# Patient Record
Sex: MALE | Race: WHITE | NOT HISPANIC OR LATINO | Employment: FULL TIME | URBAN - METROPOLITAN AREA
[De-identification: names, ages, dates, MRNs, and addresses within clinical notes are randomized per-mention and may not be internally consistent; named-entity substitution may affect disease eponyms.]

---

## 2022-12-05 ENCOUNTER — TELEPHONE (OUTPATIENT)
Dept: BEHAVIORAL HEALTH | Facility: CLINIC | Age: 42
End: 2022-12-05

## 2022-12-05 NOTE — TELEPHONE ENCOUNTER
Pt is a(n) adult (16+ out of HS) Seeking as eval for Adult DIANE Assessment for evaluation and recommendations. and is interested in Adult DIANE/Co-Occurring Program (Either In-Person or Virtual).  Appointment scheduled by:  Other  (do not run cost estimate if pt not calling for the appt themselves - send for bens)  Caller name:  [facility] [employer]    Caller phone #: [phone]  If in person, please state reason why: n/a  Brief reason for appt: Gamgling eval for programming    Cost estimate Did not get completed.  Contact information verified/updated: Yes

## 2022-12-09 ENCOUNTER — HOSPITAL ENCOUNTER (OUTPATIENT)
Dept: BEHAVIORAL HEALTH | Facility: CLINIC | Age: 42
Discharge: HOME | End: 2022-12-09
Attending: FAMILY MEDICINE | Admitting: FAMILY MEDICINE
Payer: COMMERCIAL

## 2022-12-09 ENCOUNTER — TELEPHONE (OUTPATIENT)
Dept: BEHAVIORAL HEALTH | Facility: CLINIC | Age: 42
End: 2022-12-09

## 2022-12-09 PROCEDURE — 90791 PSYCH DIAGNOSTIC EVALUATION: CPT | Mod: 95

## 2022-12-09 NOTE — PROGRESS NOTES
"Jose Antonio Krause is a 42 year old male who is participating in an evaluation for problem gambling via a billable phone/video session.    Telemedicine Visit: The patient's condition can be safely assessed and treated via synchronous audio and visual telemedicine encounter.      Reason for Telemedicine Visit: Services only offered telehealth    Originating Site (Patient Location): Patient's home    Distant Site (Provider Location): Provider Remote Setting- Home Office     The patient has been notified of the following:     \"We have found that certain health care needs can be provided without the need for a face to face visit.  This service lets us provide the care you need with a phone conversation.      I will have full access to your [facility] medical record during this entire phone call.   I will be taking notes for your medical record.     Since this is like an office visit, we will bill your insurance company for this service.  If your insurance denies the charge we will appeal and/or write off the cost of the service.  The Governor's executive order may result in expanded health insurance coverage for this service, which would be paid by your insurance.      There are potential benefits and risks of telephone visits (e.g. limits to patient confidentiality) that differ from in-person visits.?  Confidentiality still applies for telephone services, and nobody will record the visit.  It is important to be in a quiet, private space that is free of distractions (including cell phone or other devices) during the visit.??     If during the course of the call I believe a telephone visit is not appropriate, you will not be charged for this service\"    Consent:  The patient/guardian has verbally consented to: the potential risks and benefits of telemedicine (video visit) versus in person care; bill my insurance or make self-payment for services provided; and responsibility for payment of non-covered services. "     Consent has been obtained for this service by care team member: Yes    Mode of Communication:  Video Conference via [employer]    As the provider I attest to compliance with applicable laws and regulations related to telemedicine.    Phone visit start time:  10:30 AM  Phone visit end time:  12:30 PM    Length of session: 120 minutes    Counselor verified Patient with 2-point verification: Yes ;  Last name and .     Informed of Duty to Warn?   Yes       Verbal consent for Release of Information:   Yes - DHS    Releases sent to patient through [employer] for signature?  Yes - sent 2022    DHS - Problem Gambling [place]  SELF  Anna Agrawal - [facility]  Poncho Cuadra - Emergency Contact  [facility]     Gambling Evaluation   Background Information     Date of Assessment:  2022   :  MAMADOU [facility]   MRN:             [id number]   Patient Name:   Jose Antonio Krause   YOB: 1980 Age:  42 year old Gender:  male   Preferred Name:  Jose Antonio   Pronouns:  He/him   Current Address:   [address] [place] [address]     Preferred phone #:  [phone]   May we leave a program related message?  Yes     Relationship Status   Ethnicity  White   Client's Primary Language:  English   E-mail address  [email or web address]   Referral Source:  [facility] unit     Emergency :  Poncho Cuadra   Emergency Contact Phone #:  [phone]     Do you have learning disabilities or require special accommodations?    No     What prompted you to come for a gambling assessment today?     PT reported psychiatric hospitalization on 2022, reporting he was discharged on 2022.  PT reported suicidal ideations with a plan and went to the Emergency Department at [facility] following a significant financial loss gambling.  PT reported working with staff at [facility] finding a program for gambling evaluation/treatments reporting they  "found [facility] as an option.  PT denied gambling since 12/1/2022 - 12/2/2022.       Have you been diagnosed with a gambling problem?    PT reported entering into an outpatient problem gambling program about 15 years ago in [place]. PT reported attending outpatient group once per week and receiving psychiatry services.      Have you been diagnosed with alcohol or drug related problems?    No           DIMENSION I - Acute Intoxication /Withdrawal Potential     Gambling History    Early up to age 20:  PT reported gambling with friends under the age of 18, reporting \"wagers\".  PT reported age 16 started playing lottery tickets.  $20-$75 per time 2 -3 per week.  PT reported going to [employer] after about 6 months of playing lottery tickets.  PT reported continued lottery gambling, reporting he began playing [place] at the casino, about once per week. PT stated \"Progressed right away [gambling]\".  PT reported going to college in [place], reporting his gambling decreased, reporting he continued to play lottery about 2x per month and betting with friends on video games, and sporting games, such as tennis.     Age 20: PT reported he began playing Pull tabs, reporting playing with a \"Few hundred dollars\" per episode,  PT repoted occasional lottery tickets and occasional casino with his then girlfriend.  PT identified consequences as spending monies alocated for bill, financial and relational stressors.    Age 24: :  PT reported playing scratch offs, reporting a big win of $50,000.  PT reported he cards at [employer] \"all night\", and going to casino \"all of the time\".  PT reported palying [employer] online and casinos online. PT reported a lost of $17.000.    2010:  PT reported about 9 months to a year of abstinence.      Age 32: PT reported he began going to the casino with his wife, playing Bingo at the casino then began slots.  PT identified consequences of not telling is wife how much money he had and was " gambling with. PT reported his gambling continued to progress. PT reported he started going to the casino on own in addition to attending with others.     PT reported intermittent periods of 3-4 months of decreased or no gambling throughout.    Last Bet:  PT reported gamling Thursday at [employer], December 2, 2022.  PT reported gambling slots:  PT reported gambling $8000.    Substance Use History             X = Primary Drug Used   Age of First Use Most Recent Pattern of Use and Duration   Need enough information to show pattern (both frequency and amounts) and to show tolerance for each chemical that has a diagnosis   Date of last use and time, if needed   Withdrawal Potential? Requiring special care Method of use  (oral, smoked, snort, IV, etc)      Alcohol         HU/CU:  PT reports occasional use and denies any past or ongoing issues.  PAULA:  1 beer 2 weeks ago No       Marijuana/  Hashish   NA           Cocaine/Crack     N/A           Meth/  Amphetamines   N/A           Heroin     N/A           Other Opiates/  Synthetics     PT reported abusing Vicodin in the past when prescribed.  PT denied any recent use or abuse.         Inhalants     N/A           Benzodiazepines     N/A           Hallucinogens     N/A           Barbiturates/  Sedatives/  Hypnotics N/A           Over-the-Counter Drugs   N/A           Other     N/A           Nicotine       CU: Chewing tobacoo   Oral     Any current physical discomfort or withdrawal concerns?  No    Have you ever been to detox? No    How many times? NA    Have you had any of the following chemical dependency withdrawal symptoms?  Past 12 months Recent (past 30 days)   None None     Have you had any of the following gambling withdrawal symptoms?  Past 12 months Recent (past 30 days)   None None     Dimension I Ratings   Acute intoxication/Withdrawal potential - The placing authority must use the criteria in Dimension I to determine a client s acute intoxication and withdrawal  potential.    RISK DESCRIPTIONS - Severity ratin Client displays full functioning with good ability to tolerate and cope with withdrawal discomfort. No signs or symptoms of intoxication or withdrawal or resolving signs or symptoms.    REASONS SEVERITY WAS ASSIGNED (What about the amount of the person s use and date of most recent use and history of withdrawal problems suggests the potential of withdrawal symptoms requiring professional assistance? )     Patient does not appear to be under the influence or having withdrawal symptoms at the time of evaluation.  Evaluation was conducted via video conference.  No issues identified.           DIMENSION II - Biomedical Complications and Conditions     Do you have any current health/medical conditions?(Include any infectious diseases, allergies, or chronic or acute pain, history of chronic conditions)       Yes.   PT reports having an autoimmune disorder.  PT denies any current ongoing issues related to physical health.    List current medication(s) including over-the-counter or herbal supplements--including pain management:     Lexapro    Do you follow current medical recommendations/take medications as prescribed?     Yes    Do you have any dental problems?    No    Are you up to date on your medical, dental and eye appointments?     PT referred to primary providers for updated eye and dental exams.    Are you or have you ever been prescribed: Abilify (Aripiprazole), Requip (ropinirole) Zelapar (selegiline hydrochloride), Comtan (entacapone) Mirapex (pramipexole)?     No    Do you have a health care provider?    The patient's PCP is Nelson Escobar.    Do you need a referral to have a follow up with a primary care physician?    No.    Are you pregnant?     Male    Do you have any concerns regarding your nutritional status?    No    Have you had any appetite changes in the last 3 months?    No    Have you had weight loss or weight gain of more than 10 lbs in the last 3  "months?   If patient gained or lost more than 10 lbs, then refer to program RN / attending Physician for assessment.    PT reports going to the gym regulary and heating healthy.      Current height and weight?    5'9\" 178#    Do you have any pain control problems?     No    How is your pain managed?     NA    Do you have any concerns/problems with short or long-term memory?     Per PT \"I feel myself forgetting names sometimes\".  PT referred to Primary care provider.    Dimension II Ratings   Biomedical Conditions and Complications - The placing authority must use the criteria in Dimension II to determine a client s biomedical conditions and complications.   RISK DESCRIPTIONS - Severity ratin Client displays full functioning with good ability to cope with physical discomfort.    REASONS SEVERITY WAS ASSIGNED (What physical/medical problems does this person have that would inhibit his or her ability to participate in treatment? What issues does he or she have that require assistance to address?)    Patient denies any biomedical issues or concerns at this time.  Patient reports he is prescribed psychotropic medications, reporting medication compliance.  Patient reported having an established primary care provider in the community, reporting their ability to navigate the health care system independently.             DIMENSION III - Emotional, Behavioral, Cognitive Conditions and Complications     The patient grew up in:     [facility].  PT reported his parents  when he was age 4, and remained living with mom and older brother (2 years older).  PT reported his mother remarried and he has a maternal half brother, 15 years younger.  PT reported having a \"wonderful\" relationship with his mother, step father and brothers and a \"not so awesome\" relationship with his father at this time.     My childhood could be best described as:     Per PT \"Busy and active\".     Who raised you? (parents, grandparents, adoptive " "parents, step-parents, etc.)    Both Parents.  PT reported living with his mom following the divorce when he was age 4.     Growing up, the patient was supported by:     Per PT \"mom\"     Siblings:     1 older brother (2 years)  1 younger maternal half brother (15 years)     Family Substance Use Disorder/Gambling/Mental Health history:     PT reported:  DIANE:  Maternal uncles and grandmother drugs and alcohol.  MH: none known  Gambling: PT believes his father had gambling problem in his 20's.       Have you ever been emotionally or verbally abused?            No    Have you ever emotionally or verbally abused someone else?        No    Have you ever been physically abused?            No    Have you ever physically abused someone else?            No    Have you ever intentionally hurt yourself by hitting, cutting or burning yourself?            No    Do you have any thoughts of harming anyone?            No    Have you ever been sexually abused?            No    Have you ever sexually abused someone else?            No    Have you ever engaged in risky behavior that put you at risk for exposure to blood-borne or sexually transmitted diseases?    No    Have you ever been diagnosed with an eating disorder?          No    Have you ever been diagnosed with a clinical mental health disorder?            PT reports in 2010 he was diagnosed with ADHD and OCD when he attended gambling treatment.   PT reports a diagnosis of Depression from most recent psychiatric hospitalization at [facility].     Have you ever been prescribed any medications for your mental health?            Yes.  When were you prescribed these medications? PT reports he was prescribed medications in about 2010 and denied consistent medication compliance. PT reports he was prescribed Lexapro while at [facility] for psychiatric hospitalization December 2022, reporting current medication compliance.     What medications are you currently taking?            Medications: " "Lexapro.  Are you taking these medications as prescribed?  Yes.  How helpful are the medications?  PT reported he started taking them within the past week.    Are you currently seeing a mental health therapist?            Yes, please explain: PT reported having a mental health therapist, reporting he had a session on Wednesday, December 7, 2022 following discharge form Psychistric Hospitalization.  PT reported having additional sessions scheduled.  PT reported this as an established mental health therapist, reporting visits for a year following his divorce and suicide attempt a year and a half ago, reporting he stopped seeing her about 6 months ago prior to Wednesdays appointment.      Have you ever had a suicide attempt?    Yes, please explain: PT reported taking pills about a year and a half ago, reporting when he woke up, he went to the Emergency Department.    Have you ever had any psychiatric hospitalizations?            Yes, please explain: A year and a half ago due to suicide attempt.  PT also reports psychiatric hospitalization on December 2, 2022 - December 7, 2022 due to suicidal ideations with a plan following a gambling loss.    Have you ever been in the ?    No    Highest grade of school completed:     PT reported attending college, reporting he has 2 classes left for Bachelors.    Describe your preferred learning style:      by reading, by hands-on practice and by watching someone else demonstrate    Are you currently in school?            No    What are your greatest personal strengths?            Per patient \"caring for my family\".    What do you value most in life?            Per patient \"family and kids\".    GAIN Short Screener     1.)  When was the last time that you had significant problems...  A. with feeling very trapped, lonely, sad, blue, depressed or hopeless  about the future? Past Month    B. with sleep trouble, such as bad dreams, sleeping restlessly, or falling  asleep during the " day? PT reports he has never slept well.  PT reported family members as the same.    C. with feeling very anxious, nervous, tense, scared, panicked, or like  something bad was going to happen? Past Month    D. with becoming very distressed and upset when something reminded  you of the past? Past Month    E. with thinking about ending your life or committing suicide? Past Month    2.)  When was the last time that you did the following things two or more times?  A. Lied or conned to get things you wanted or to avoid having to do  something? Past month    B. Had a hard time paying attention at school, work, or home? Never    C. Had a hard time listening to instructions at school, work, or home? Never    D. Were a bully or threatened other people? Never    E. Started physical fights with other people? Never    Note: These questions are from the Global Appraisal of Individual Needs--Short Screener. Any item marked  past month  or  2 to 12 months ago  will be scored with a severity rating of at least 2.     For each item that has occurred in the past month or past year ask follow up questions to determine how often the person has felt this way or has the behavior occurred? How recently? How has it affected their daily living? And, whether they were using or in withdrawal at the time?    If the person has answered item 1E with  in the past year  or  the past month , ask about frequency and history of suicide in the family or someone close and whether they were under the influence.     PT reported admission to Emergency Department and admitted for psychiatric hospitalization at [facility] following suicidal ideations and having a plan on 12/2/2022. PT reported he discharged on 12/7/2022.  PT denied having any suicide ideation and/or self harm thoughts or plans following discharge from [facility] on 12/7/2022.    Has anyone close to you, a family member, a friend or a significant other attempted or completed a  suicide?     No    If the person answered item 1E  in the past month  ask about intent, plan, means and access and any other follow-up information to determine imminent risk. Document any actions taken to intervene on any identified imminent risk.       PT reported admission to Emergency Department and admitted for psychiatric hospitalization at [facility] following suicidal ideations and having a plan on 2022. PT reported he discharged on 2022.  PT denied having any suicide ideation and/or self harm thoughts or plans following discharge from [facility] on 2022.  PT reported he no longer has access to a gun or weapons at this time.  PT reported he was seen by psychiatry while at [facility] and was prescribed Lexapro.  PT also reported he has begun seeing his mental health therapist, reporting his last visit as 2022 following discharge from [facility] and reports having an additional session scheduled.    Dimension III Ratings   Emotional/Behavioral/Cognitive - The placing authority must use the criteria in Dimension III to determine a client s emotional, behavioral, and cognitive conditions and complications.   RISK DESCRIPTIONS - Severity ratin Client has difficulty with impulse control and lacks coping skills. Client has thoughts of suicide or harm to others without means; however, the thoughts may interfere with participation in some treatment activities. Client has difficulty functioning in significant life areas. Client has moderate symptoms of emotional, behavioral, or cognitive problems. Client is able to participate in most treatment activities.    REASONS SEVERITY WAS ASSIGNED - What current issues might with thinking, feelings or behavior pose barriers to participation in a treatment program? What coping skills or other assets does the person have to offset those issues? Are these problems that can be initially accommodated by a treatment provider? If not, what  "specialized skills or attributes must a provider have?    Patient reports a formal mental health diagnosis of Depression, reporting a current prescription for psychotropic medications.  Patient reports current medication compliance.  Patient's PHQ-9 score was 16 out of 27, indicating moderately severe depression.  Patient's CAROLANN-7 score was 13, indicating moderate anxiety.  Patient reported a suicide attempt in the past.   Patient reported having a mental health therapist at this time, reporting his last visit as 2022. Patient appears to lack impulse control and the necessary strategies to manage both mental health and emotional health at this time.  Patient would benefit from following all of the recommendations of current mental health providers.           DIMENSION IV - Readiness for Change     How has your gambling affected relationships in your life?     Per patient \"Always lying, deceitful to get money to go gambling.  Arguments in my marriage\". PT reported Lying and hiding his gambling.     How has your gambling affected your finances?     Per patient \"it's always put me in the position to scrape to pay bills\".      What values has gambling affected in your life?     Per patient \"value of being social and friendships\".    Has anyone expressed concern about your gambling?     No    What changes are you willing to make relative to your gambling?     Per patient \"I am for the first time ever. I'm ready to not walker and do what ever I need to do to stay out of it\".    How would you describe your current motivation to stop gambling?     Per patient \"very motivated.\".      Dimension IV Ratings   Readiness for Change - The placing authority must use the criteria in Dimension IV to determine a client s readiness for change.   RISK DESCRIPTIONS - Severity ratin Client is cooperative, motivated, ready to change, admits problems, committed to change, and engaged in treatment as a responsible " "participant.    REASONS SEVERITY WAS ASSIGNED - (What information did the person provide that supports your assessment of his or her readiness to change? How aware is the person of problems caused by continued use? How willing is she or he to make changes? What does the person feel would be helpful? What has the person been able to do without help?)      Patient reports their willingness to follow treatment recommendations, reporting internal motivation at this time.  Patient denies external motivation factors at this time, reporting their own awareness and decision to seek treatment services.  Patient displays insight into how gambling has impacted their life and for those around them, in addition, patient has expressed a desire to abstain from gambling and verbalizes their willingness to enter treatment and make significant life changes.           DIMENSION V - Relapse, Continued Use, and Continued Problem Potential     What triggers or situations increase your likelihood to walker?    Per patient \"Access to cash, feeling lonely and depressed, and relationship stressors \".    What was your longest period of abstinence from gambling?    PT reported about 9 months to a year following gambling treatment in 2010.    What did you do to stop gambling?    PT reports attending treatment.    What was your longest period of abstinence from alcohol/drugs?    PT denies any issues related to substance use at this time.    History of Gambling/Substance Use Disorder treatments  Where  (Program) When  (Year) Treatment   (CD/Gamb) Completed  (Yes/No) Length of time GA or CD [facility]     2010   Gambling      9 months to a year     If you had prior  or Gambling or Substance Use Disorder treatment, What was helpful?  What was not helpful?    PT reported feeling not fully engaged in gambling treatment in 2010.    Please identify which self-help groups you have attended and how often you attended (Gamblers Anonymous, AA, NA, " etc.)?    PT shared attending Gamblers Anonymous in the past and denied any recent attendance.    What has helped you reduce your urges to walker?    PT reported staying busy.      Dimension V Ratings   Relapse/Continued Use/Continued problem potential - The placing authority must use the criteria in Dimension V to determine a client s relapse, continued use, and continued problem potential.   RISK DESCRIPTIONS - Severity rating: 3 Client has poor recognition and understanding of relapse and recidivism issues and displays moderately high vulnerability for further substance use or mental health problems. Client has few coping skills and rarely applies coping skills.    REASONS SEVERITY WAS ASSIGNED - (What information did the person provide that indicates his or her understanding of relapse issues? What about the person s experience indicates how prone he or she is to relapse? What coping skills does the person have that decrease relapse potential?)      Patient displays limited understanding of addiction and relapse potential.  Patient reported attending Gamblers Anonymous (GA) groups in the past (about 2010) and denied any current attendance.  Patient reported attending an outpatient problem gambling treatment in the past.  Patient appears to lack knowledge of the addictions cycle, insight into their personal relapse process along with warning signs and triggers.    Patient appears to lack impulse control, gambling free coping skills and long-term maintenance skills.  The patient appears to be at risk for relapsing in gambling behaviors if they do not seek treatment services at this time.            DIMENSION VI - Recovery Environment   Are you currently working or in school? Please explain.     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     How has gambling affected your work?    PT reporrted calling into work following gambing all night and taking money from business account.    How would you  describe your current financial status?  In serious debt    Are you are having problems with unpaid bills, bankruptcy, IRS problems, etc.?    No    What is your approximate present gambling debt?    $50,0000 - $60,000    Describe a typical week for you i.e. work, leisure activities, socializing, etc.     PT reports working framing construction, 40-50 hours per week, Monday - Friday.  PT reported having occasional weekend side jobs.  Social/leisure, PT reported going to the gym daily for an hour.  PT reported visiting his brother often and his ex-brother in law.    Are you currently in a significant relationship?     No    Sexual Orientation:     Heterosexual    Who do you live with?      PT reports living alone.    Do you have any children?      Yes, please explain: Ages 22 and 18    How many times have you been ?    Once.  PT reports he is currently .    Describe your current support system i.e. family, friends, sponsor, therapist, etc.?      Moms, brothers, friends, , therapist, and children. PT also reported other business partners as supportive.    Do you have any past or present legal charges?    No    Do you have any obstacles that would prevent you from participating in treatment?    No    Do you pray, meditate, do yoga, attend AA/NA/GA or other spiritual practices?    Yes, please explain: meditation.    Do you participate in community brian activies such as attending Shinto, temple, Confucianism, or Religion?      No    How does spirituality impact your recovery?    PT reported wanting to get more active in Shinto    Please list any other problems, issues or concerns that could affect your recovery if not addressed?      PT denied.    Dimension VI Ratings   Recovery environment - The placing authority must use the criteria in Dimension VI to determine a client s recovery environment.   RISK DESCRIPTIONS - Severity ratin Client is engaged in structured, meaningful activity and  "has a supportive significant other, family, and living environment.    REASONS SEVERITY WAS ASSIGNED - (What support does the person have for making changes? What structure/stability does the person have in his or her daily life that will increase the likelihood that changes can be sustained? What problems exist in the person s environment that will jeopardize getting/staying clean and sober?)     The patient reports living independently at the time of assessment.  Patient reports living environment is supportive of recovery efforts.  Patient denies he is currently in a significant relationship.  Patient appears to have limited external activities, leisure time, friends, or social groups outside of gambling at this time.  The patient appears to lack adequate support in the community through 12-step meetings or other recovery based interactions at this time and appears to lack additional supports familiar with recovery.  Patient reports he is currently employed, working 40-60 hours per week and appears to lack meaningful activities outside of employment.  Patient denies any current legal involvement at this time.           Collateral Contacts     No collateral contacts made at this time.        Summary of Gambling Disorder Symptoms     Needs to walker with increasing amount of money in order to achieve desired excitement.  Is restlessness or irritable when attempting to cut down or stop gambling.  Has made repeated unsuccessful efforts to cut down or stop gambling.  Is often preoccupied with gambling (e.g. having persistent thoughts of reliving past gambling experiences, handicapping or planning the next venture, thinking of ways to get money with which to walker).  Often gambles when feeling distressed (e.g. feelings of helplessness, guilt, anxiety, depression).  After losing money gambling, individual often returned another day to get even. (\"chasing one's losses\")  Lies to conceal the extent of involvement with " "gambling.  Relies on others to provide money to relieve desperate financial situations caused by gambling (\"a bailout\")    Specify if:   Episodic:  Meeting diagnostic at more than one time point, with symptoms subsiding between periods of gambling disorder for at least several months.    Persistent:  Experiencing continuous symptoms, to meet diagnostic criteria for multiple years.    Specify if:   In early remission:  After full criteria for alcohol/drug use disorder were previously met, none of the criteria for alcohol/drug use disorder have been met for at least 3 months but for less than 12 months (with the exception that Criterion A4,  Craving or a strong desire or urge to use alcohol/drug  may be met).     In sustained remission:   After full criteria for alcohol use disorder were previously met, none of the criteria for alcohol/drug use disorder have been met at any time during a period of 12 months or longer (with the exception that Criterion A4,  Craving or strong desire or urge to use alcohol/drug  may be met).   Specify if:   This additional specifier is used if the individual is in an environment where access to alcohol is restricted.    Mild: Presence of 4-5 symptoms    Moderate: Presence of 6-7 symptoms    Severe: Presence of 8 or more symptoms      Summary of Substance Abuse Disorder Symptoms     A problematic pattern of alcohol/drug use leading to clinically significant impairment or distress, as manifested by at least two of the following, occurring within a 12-month period:    NA    Specify if:   In early remission:  After full criteria for alcohol/drug use disorder were previously met, none of the criteria for alcohol/drug use disorder have been met for at least 3 months but for less than 12 months (with the exception that Criterion A4,  Craving or a strong desire or urge to use alcohol/drug  may be met).     In sustained remission:   After full criteria for alcohol use disorder were previously " met, none of the criteria for alcohol/drug use disorder have been met at any time during a period of 12 months or longer (with the exception that Criterion A4,  Craving or strong desire or urge to use alcohol/drug  may be met).   Specify if:   This additional specifier is used if the individual is in an environment where access to alcohol is restricted.    Mild: Presence of 2-3 symptoms    Moderate: Presence of 4-5 symptoms    Severe: Presence of 6 or more symptoms    SOGS: 16 DSM-5: 8 CAGE-AID: 0 CAROLANN-7: 13 PHQ-9: 16       Vulnerable Adult Checklist for OUTPATIENTS     1.  Do you have a physical, emotional or mental infirmity or dysfunction?       No    2.  Does this issue impair your ability to provide for your own care without help, including providing yourself with food, shelter, clothing, healthcare or supervision?       No    3.  Because of this issue, I need assistance to protect myself from maltreatment by others.      No    Based on the above information:    This person is not a functional Vulnerable Adult according to [place] Statute 626.5572 subdivision 21.      Category Severity (ICD-10 Code / DSM 5 Code)   Gambling Disorder Severe  (F63.0) (312.31)     [place]-Suicide Severity Rating Scale   Suicide Ideation   1.) Have you ever wished you were dead or that you could go to sleep and not wake up?     Lifetime:  Yes Past Month:  Yes   2.) Have you actually had any thoughts of killing yourself?   Lifetime:  Yes Past Month:  Yes   3.) Have you been thinking about how you might do this?     Lifetime:  No Past Month:  Yes, Describe: PT shared having a gun.  PT reported driving to the Emergency Department at [facility] on 12/2/2022 at 5am due to suicidal ideations.   4.) Have you had these thoughts and had some intention of acting on them?     Lifetime:  Yes, Describe: PT reported taking pills a year and a half ago. Past Month:  Yes, Describe: PT reported following a large loss gambling, having a gun in his  "possession.   5.) Have you started to work out the details of how to kill yourself?   Lifetime:  Yes, Describe: PT reported taking pills a year and a half ago. Past Month:  Yes, Describe: PT reported having a gun.   6.) Do you intend to carry out this plan?      Lifetime:  Yes, Describe: PT reported attempt a year and a half ago with pills. Past Month:  Yes, Describe: PT reported intent with a gun, reporting he drove to the Emergency Department on 12/2/2022 at 5 am due to ideation.  PT denied attempt at this time, reporting admission to psychiatric hospitalization at [facility].     Intensity of Ideation   Intensity of ideation (1 being least severe, 5 being most severe):     Lifetime:  5 Past Month:  5   How often do you have these thoughts?  PT reported having suicial ideaitons daily prior to admission to psychiatric hospitalization on 12/2/2022, PT denied having any suicidal ideations following discharge of hospital on 12/7/2022.    When you have the thoughts how long do they last? PT reported prior to 12/2/2022 hospitalization admission, PT reported thoughts lasting less than 1 hour/some of the time.  PT reported more intense thoughts folloiwng gambling lossds at the [facility].  PT denied any suicidal thoughts following discharge of [facility].   Can you stop thinking about killing yourself or wanting to die if you want to?  PT reported difficulty in controlling thoughts.   Are there things - anyone or anything (i.e. family, Yazidism, pain of death) that stopped you from wanting to die or acting on thoughts of suicide?  Protective factors definitely stopped you from attempting suicide - Per PT: \"My children\".   What sort of reasons did you have for thinking about wanting to die or killing yourself (ie end pain, stop how you were feeling, get attention or reaction, revenge)?  Completely to end or stop the pain (you couldn't go on living the way you were feeling) PT stated \"I cant keep doing this, the gambling\". "   Suicidal Behavior   (Suicide Attempt) - Have you made a suicide attempt?     Lifetime:  Yes.  Total number of attempts:  1.  Date of most recent attempt:  A year and a half ago. PT reported attempt to cut self and take pills.  PT reported waking up after taking pills and going to the hospital. Past Month:  PT denied any suicide attempt in the past month.   Have you engaged in self-harm (non-suicidal self-injury)?  No   (Interrupted Attempt) - Has there been a time when you started to do something to end your life but someone or something stopped you before you actually did anything?  No   (Aborted or Self-Interrupted Attempt) - Has there been a time when you started to do something to try to end your life but you stopped yourself before you actually did anything?  Yes, Describe: PT reported having access to a gun and reported seeking services at [facility] Emergency Department  Friday, 12/2/2022.   (Preparatory Acts of Behavior) - Have you taken any steps towards making suicide attempt or preparing to kill yourself (such as collecting pills, getting a gun, giving valuables away or writing a suicide note)?  Yes, Describe: PT reported having access to a gun.  *PT reported to writer at the time of evaluation, he no longer has access to weapons or guns and denied having any weapons or guns in his possession.    Actual Lethality/Medical Damage:  PT reported seeking medical services and psychiatric hospitalization following suicide attempt of taking pills a year and a half ago       2008  [employer]  Used with permission by Marlena Sharp, PhD.       Guide to C-SSRS Risk Ratings   NO IDEATION:  with no active thoughts IDEATION: with a wish to die. IDEATION: with active thoughts. Risk Ratings   If Yes No No 0 - Very Low Risk   If NA Yes No 1 - Low Risk   If NA Yes Yes 2 - Low/moderate risk   IDEATION: associated thoughts of methods without intent or plan INTENT: Intent to follow  "through on suicide PLAN: Plan to follow through on suicide Risk Ratings cont...   If Yes No No 3 - Moderate Risk   If Yes Yes No 4 - High Risk   If Yes Yes Yes 5 - High Risk   The patient's ADDITIONAL RISK FACTORS and lack of PROTECTIVE FACTORS may increase their overall suicide risk ratings.     Additional Risk Factors:    Significant history of having untreated or poorly treated mental health symptoms     Tendency to be socially isolated and/or cut off from the support of others     A triggering event(s) leading to humiliation, shame or despair     History of impulsive or aggressive behaviors   Protective Factors:    Having people in his/her life that would prevent the patient from considering a suicide attempt (i.e. young children, spouse, parents, etc.)     An absence of mental health issues or stable and well treated mental health issues     A positive relationship with his/her clinical medical and/or mental health providers     Having easy access to supportive family members     Having a good community support network     Having cultural, Muslim or spiritual beliefs that discourage suicide     Having restricted access to highly lethal means of suicide     Risk Status   0. - Very Low Risk:  Evaluation Counselors:  Document in Epic / SBAR to counselor \"Very Low Risk\".      Treatment Counselors:  Reassess upon admission as applicable, assess weekly in progress notes under Dimension 3 and summarize in Discharge / Treatment summary under Dimension 3.   Additional information to support suicide risk rating: PT denied any suicidal ideations or plans at the time of evaluation.  Counselor and PT completed safety plan.     Summary of [facility] Placement Criteria:   I.) Intoxication and Withdrawal: 0   II.) Biomedical:  0   III.) Emotional and Behavioral:  2   IV.) Readiness to Change:  0   V.) Relapse Potential: 3   VI.) Recovery Environmental: 0     Evaluation Summary and Plan   's Recommendation    Patient was " "offered information on inpatient treatment, other outpatient options, individual counselors, and Gamblers Anonymous.  PT was referred to inpatient treatment at [facility].  PT also reported having an interest in attending outpatient treatment at [facility] as it worked for his schedule.   has a financial interest in patient attending the [place] program.     PT reported to writer he is considering inpatient treatment, however, he would like to discuss this option with his .  PT reported he would contact counseling staff on Monday, December 12, 2022 regarding options.  Assessors clinical opinion PT would benefit from group therapy.     Assessors Recommendations:      Abstain from all forms of gambling, including \"free\" games and online/el games.    Refrain from entering all types of codi establishments.    Self-ban with the help of a trusted other from all local casinos/card rooms, cut up players cards and have all gambling mail stopped.    Attend and complete compulsive gambling treatment programming.    Follow all ongoing recommendations of current mental health clinicians.    Attend Gamblers Anonymous (GA) 12-step, cultural, spiritual, and/or other supportive community meetings on a weekly basis.    Check in weekly with a trusted individual whom will hold you accountable.    Initial problem list:    The patient lacks relapse prevention skills  The patient has poor coping skills  The patient has poor refusal skills   The patient lacks a sober peer support network  The patient has a tendency to isolate  The patient has dual issues of MI and CD    Strengths:  Family support  Stable Employment  Financially secure  Properly treated mental health concerns  Properly treated physical health concerns  Stable housing  Intelligent  Functional communication skills      "

## 2022-12-09 NOTE — TELEPHONE ENCOUNTER
Patient have a video appointment today at 10:30am with [facility]. Writer placed a call this morning to check in. Unable to get a hold of patient to check in. Writer left a voicemail with writer's call back.

## 2022-12-09 NOTE — PROGRESS NOTES
"[facility]                                                SAFETY PLAN:  Step 1: Warning signs / cues (Thoughts, images, mood, situation, behavior) that a crisis may be developing:    Thoughts: \"I can't do this anymore\" PT stated \"Who gives a shit\"    Images: PT denied    Thinking Processes: ruminations (can't stop thinking about my problems): PT stated \"I'm never going to be able to quit\". and highly critical and negative thoughts.    Mood: worsening depression and hopelessness    Behaviors: isolating/withdrawing     Situations: relapse and financial stress Relationship stress (trigger to walker)  Step 2: Coping strategies - Things I can do to take my mind off of my problems without contacting another person (relaxation technique, physical activity):    Distress Tolerance Strategies:  relaxation activities: meditation going to the gym    Physical Activities: exercise: Going to the gym, Shoe shopping, baking and cooking.    Focus on helpful thoughts:  Positive self-talk. PT reported watching a positive documentary on [employer].  Step 3: People and social settings that provide distraction:   Name: Rayo Phone: [phone]   Name: Oral Phone: [phone]   Name: Larry \"Lopes\" Phone: [phone]    \"Going to the gym nd movie theater, going for a walk, being outside. A good movie on [employer]\".  Step 4: Remind myself of people and things that are important to me and worth living for:  \"My kids, My Family\".    Step 5: When I am in crisis, I can ask these people to help me use my safety plan:   Name: Rayo Phone: [phone]   Name: Oral Phone: [phone]   Name: Larry \"Lopes\" Phone: [phone]  Step 6: Making the environment safe:     be around others  Step 7: Professionals or agencies I can contact during a crisis:    Suicide Prevention Lifeline: 988    Crisis Text Line: Text [place] to 983692  [facility]: 1-764.743.7248    Call 911 or go to my nearest emergency department.   I helped develop " this safety plan and agree to use it when needed.  I have been given a copy of this plan.    PT and counselor completed safety plan via video conference.  Client signature _________________________________________________________________  Today s date:  12/9/2022  Adapted from Safety Plan Template 2008 Emelyn Manning and Hayden Byrd is reprinted with the express permission of the authors.  No portion of the Safety Plan Template may be reproduced without the express, written permission.  You can contact the authors at [email or web address] or [email or web address].

## 2022-12-13 ENCOUNTER — HOSPITAL ENCOUNTER (OUTPATIENT)
Dept: BEHAVIORAL HEALTH | Facility: CLINIC | Age: 42
Discharge: HOME | End: 2022-12-13
Attending: FAMILY MEDICINE
Payer: COMMERCIAL

## 2022-12-13 PROCEDURE — 90853 GROUP PSYCHOTHERAPY: CPT | Mod: 95

## 2022-12-13 PROCEDURE — 90832 PSYTX W PT 30 MINUTES: CPT | Mod: 95

## 2022-12-13 NOTE — PROGRESS NOTES
"Individual counseling session:    Telemedicine Visit: The patient's condition can be safely assessed and treated via synchronous audio and visual telemedicine encounter.      Reason for Telemedicine Visit: Patient has requested telehealth visit    Originating Site (Patient Location) [place]  - [place] [place] site. No address    Distant Site (Provider Location): Provider Remote Setting- Home Office    Consent:  The patient/guardian has verbally consented to: the potential risks and benefits of telemedicine (video visit) versus in person care; bill my insurance or make self-payment for services provided; and responsibility for payment of non-covered services.     Mode of Communication:  Video Conference via [employer]    As the provider I attest to compliance with applicable laws and regulations related to telemedicine.    Counselor verified Patient with 2-point verification: Patient is known to provider.    Video visit start time: 2:00pm  Video visit end time: 2:17pm    Length of session: 17 minutes    D: Writer and patient met for scheduled individual session. Writer went over orientation information with patient. Patient created three treatment goals. Writer discussed agenda for craig's group. Patient share he currently has a mental health provider and will be looking for GA meetings to attend. Patient also mentioned he is still trying to \"make [employer] work\". Patient reported OK to share phone number with group members.     Treatment Goals:  1.\"Stop gambling\"  2.\"Getting rid of depression\"  3.\"Builiding a healthier mind frame\"    I: Counselor facilitated 1:1 session.  A: Patient appeared motivated to work on recovery.   P: Patient to attend first group session craig.       Nirali MCALLISTERC, [facility]           "

## 2022-12-14 ENCOUNTER — HOSPITAL ENCOUNTER (OUTPATIENT)
Dept: BEHAVIORAL HEALTH | Facility: CLINIC | Age: 42
Discharge: HOME | End: 2022-12-14
Attending: FAMILY MEDICINE
Payer: COMMERCIAL

## 2022-12-14 PROCEDURE — 999N000104 HC STATISTIC NO CHARGE: Mod: GT

## 2022-12-14 NOTE — ADDENDUM NOTE
Encounter addended by: Nirali Briceño [facility] on: 12/13/2022 7:49 PM   Actions taken: Clinical Note Signed

## 2022-12-14 NOTE — PROGRESS NOTES
"Group Therapy Documentation     Was the patient seen in-person or via Telemedicine (if in-person skip to Group Note): Telemedicine    If Telemedicine: The patient's condition can be safely assessed and treated via synchronous audio and visual telemedicine encounter. ? ?      Reason for Telemedicine Visit: Patient has requested telehealth visit    Originating Site (Patient Location): Patient's home    Distant Site (Provider Location): Provider Remote Setting- Home Office    Consent: ?The patient/guardian has verbally consented to: the potential risks and benefits of telemedicine (video visit) versus in person care; bill my insurance or make self-payment for services provided; and responsibility for payment of non-covered services.       Mode of Communication:??Video Conference via Zoom      As the provider I attest to compliance with applicable laws and regulations related to telemedicine.        Patient attended a video group session on the following days: ?          GROUP NOTE:  DATE OF SERVICE:  December 13, 2022    START TIME:  5:30pm    END TIME:  7:15pm    Group Length:   105 minutes     FACILITATOR(S):    Nirali CEDILLO [facility] [place]    TOPIC: BEH Group Therapy, Problem Gambling Group     Number of patients attending the group: 8    Group Therapy Type:  Problem Gambling Group    Group Attendance:  Client attended group     Summary of Group / Topics Discussed:   Group started with the welcome to our new group member. Each patient took turns introducing themselves and then the new group member shared a little about their story and what brought them to our program. Patient's took turns with check in and sharing two feelings/emotions. Writer shared a video from [employer] \"15 Ways to Reduce Screen Time\". After the video a discussion was held on screen time and writer shared instructions on how to access screen time/digital wellbeing times on their phones. How many times the phone was unlocked in a day, how much times was spent " on the phone and what apps were used. Writer asked patients to keep a log of how much screen time they had for one day, including work computer, TV, tablets. Patients to share this information on Thursday if they want to.      Patient's response to the group topic/interactions:   Patient appeared to gain more understanding of each other and what options they have to reduce their screen times.       Client specific details:    At beginning of group before any other patient arrived, patient verbally consented to the Admission Orientation Check list. After group this was faxed to HIM to be added to chart. This was patient's first group session. Patient was open and an active member in discussions. Patient shared he continues to see his mental health therapist each week.       Nirali CEDILLO, [place]    .

## 2022-12-15 ENCOUNTER — HOSPITAL ENCOUNTER (OUTPATIENT)
Dept: BEHAVIORAL HEALTH | Facility: CLINIC | Age: 42
Discharge: HOME | End: 2022-12-15
Attending: FAMILY MEDICINE
Payer: COMMERCIAL

## 2022-12-15 PROCEDURE — 90853 GROUP PSYCHOTHERAPY: CPT | Mod: 95

## 2022-12-15 NOTE — PROGRESS NOTES
"*NO CHARGE/BILLING FOR GROUP - 2 patients attended group.    Family  Group Documentation     Was the patient seen in-person or via Telemedicine (if in-person skip to Group Note): Telemedicine    If Telemedicine: The patient's condition can be safely assessed and treated via synchronous audio and visual telemedicine encounter. ? ?      Reason for Telemedicine Visit: Services only offered telehealth    Originating Site (Patient Location): Patient's home    Distant Site (Provider Location): Provider Remote Setting- Home Office    Consent: ?The patient/guardian has verbally consented to: the potential risks and benefits of telemedicine (video visit) versus in person care; bill my insurance or make self-payment for services provided; and responsibility for payment of non-covered services.       Mode of Communication:??Video Conference via Zoom      As the provider I attest to compliance with applicable laws and regulations related to telemedicine.        Patient attended a video group session on the following days: ?          GROUP NOTE:  DATE OF SERVICE:  December 14, 2022    START TIME:  5:30 PM    END TIME:  7:22 PM    Group Length:   112 minutes    FACILITATOR(S):  MAMADOU Chen, [facility]    TOPIC: BEH Group Therapy, Problem Gambling Group - Family    Number of patients attending the group: 2     2 family members were also in attendance via video conference.    Group Therapy Type:  Problem Gambling Family Group    Group Attendance:  Client attended group with 2 family members.    Summary of Group / Topics Discussed: Group checked in with introductions.  Writer offered overview of family group and discussion with today's topic on communication.  Discussions were held on strengthening communication in relationships in recovery, including \"I\" statements, offering/accepting feedback, eye contact and body language as well as discussions on communication barriers.  Defense mechanisms were discussed and how they " may impact connecting to family members in recovery.  Group members and family members participated in group discussion.     Patient's response to the group topic/interactions:  PT appeared to gain insight into various ways to strengthen communication in relationship.      Client specific details:  PT shared communication and connection in recovery with family members, including his children, brothers and mother.    Carla Goldstein, MAMADOU, CGC

## 2022-12-16 NOTE — PROGRESS NOTES
Group Therapy Documentation     Was the patient seen in-person or via Telemedicine (if in-person skip to Group Note): Telemedicine    If Telemedicine: The patient's condition can be safely assessed and treated via synchronous audio and visual telemedicine encounter. ? ?      Reason for Telemedicine Visit: Patient has requested telehealth visit    Originating Site (Patient Location): Patient's home    Distant Site (Provider Location): Provider Remote Setting- Home Office    Consent: ?The patient/guardian has verbally consented to: the potential risks and benefits of telemedicine (video visit) versus in person care; bill my insurance or make self-payment for services provided; and responsibility for payment of non-covered services.       Mode of Communication:??Video Conference via Zoom      As the provider I attest to compliance with applicable laws and regulations related to telemedicine.        Patient attended a video group session on the following days: ?          GROUP NOTE:  DATE OF SERVICE:  December 15, 2022    START TIME:  5:30pm    END TIME:  7:01pm    Group Length:   91 Minutes    FACILITATOR(S):    Nirali CEDILLO [facility]    TOPIC: BEH Group Therapy, Problem Gambling Group     Number of patients attending the group: 6    Group Therapy Type:  Problem Gambling Group    Group Attendance:  Client attended group     Summary of Group / Topics Discussed:   Group started with everyone taking turns with check in and identifying two feelings/emotions. A YouTube video was shown :Wheel of Life. Discussion was help on how to complete a Life Balance Wheel, with added discussion from patient who previously completed one. Writer shared with group two different lists of Coping Skills. One showing 99 Coping Skills, one showing examples of skills that can be used for grounding, distraction, emotional release, self love, thought challenge and accessing your higher self. Discussion was held on which coping skills each group member  could use. Group ended with a mindfulness reading/exercise.      Patient's response to the group topic/interactions:   Patient appeared to gain more awareness on completing a Life Balance Wheel and identifying coping skills that can be used in the future.       Client specific details:   Patient was an active member in group and in discussions. Patient shared many things from the Coping Skills lists he would use in the future. Patient shared with the group that he had a  positive experience in the family group last night.       Nirali Briceño LADC, CGC

## 2022-12-20 ENCOUNTER — HOSPITAL ENCOUNTER (OUTPATIENT)
Dept: BEHAVIORAL HEALTH | Facility: CLINIC | Age: 42
Discharge: HOME | End: 2022-12-20
Attending: FAMILY MEDICINE
Payer: COMMERCIAL

## 2022-12-20 PROCEDURE — 90853 GROUP PSYCHOTHERAPY: CPT | Mod: GT

## 2022-12-21 ENCOUNTER — HOSPITAL ENCOUNTER (OUTPATIENT)
Dept: BEHAVIORAL HEALTH | Facility: CLINIC | Age: 42
Discharge: HOME | End: 2022-12-21
Attending: FAMILY MEDICINE
Payer: COMMERCIAL

## 2022-12-21 PROCEDURE — 90832 PSYTX W PT 30 MINUTES: CPT | Mod: GT

## 2022-12-21 NOTE — PROGRESS NOTES
Group Therapy Documentation     Was the patient seen in-person or via Telemedicine (if in-person skip to Group Note): Telemedicine    If Telemedicine: The patient's condition can be safely assessed and treated via synchronous audio and visual telemedicine encounter. ? ?      Reason for Telemedicine Visit: Patient has requested telehealth visit    Originating Site (Patient Location): Patient's home    Distant Site (Provider Location): Provider Remote Setting- Home Office    Consent: ?The patient/guardian has verbally consented to: the potential risks and benefits of telemedicine (video visit) versus in person care; bill my insurance or make self-payment for services provided; and responsibility for payment of non-covered services.       Mode of Communication:??Video Conference via Zoom      As the provider I attest to compliance with applicable laws and regulations related to telemedicine.        Patient attended a video group session on the following days: ?          GROUP NOTE:  DATE OF SERVICE:  December 20, 2022    START TIME:  5:30pm    END TIME:  7:21pm    Group Length:   111 minutes    FACILITATOR(S):    Nirali CEDILLO CGC    TOPIC: BEH Group Therapy, Problem Gambling Group     Number of patients attending the group: 5    Group Therapy Type:  Problem Gambling Group    Group Attendance:  Client attended group     Summary of Group / Topics Discussed:   Group started with each patient taking turns for check in and identifying two feelings. Follow up discussion was held on topics from last week: Reducing screen time, Coping Skills and mindful eating. Some patients shared their upcoming stressors for the holiday season and discussing ways to deal with gambling during the holiday celebrations and family get togethers. Writer and patients went over the workbook: A Guide to Managing Stress. Learning and discussing different ways to identify stress and techniques to deal with stress. Group ended with a mindfulness  reading.      Patient's response to the group topic/interactions:    Patient appeared to gain more understanding of identifying and ways to deal with stress.       Client specific details:   Patient shared with group feeling uneasy about upcoming time off from work. Patient shared he will be putting together a list of things he can do to fill his time. Patient also shared with group that he uses mindfulness YouTube videos for helping to fall asleep and motivational video in the morning.       Nirali CEDILLO, CGC

## 2022-12-22 ENCOUNTER — HOSPITAL ENCOUNTER (OUTPATIENT)
Dept: BEHAVIORAL HEALTH | Facility: CLINIC | Age: 42
Discharge: HOME | End: 2022-12-22
Attending: FAMILY MEDICINE
Payer: COMMERCIAL

## 2022-12-22 ENCOUNTER — TELEPHONE (OUTPATIENT)
Dept: BEHAVIORAL HEALTH | Facility: CLINIC | Age: 42
End: 2022-12-22

## 2022-12-22 PROCEDURE — 90853 GROUP PSYCHOTHERAPY: CPT | Mod: GT

## 2022-12-22 NOTE — PROGRESS NOTES
Individual counseling session:    Telemedicine Visit: The patient's condition can be safely assessed and treated via synchronous audio and visual telemedicine encounter.      Reason for Telemedicine Visit: Services only offered telehealth    Originating Site (Patient Location): Patient's home    Distant Site (Provider Location): Provider Remote Setting- Home Office    Consent:  The patient/guardian has verbally consented to: the potential risks and benefits of telemedicine (video visit) versus in person care; bill my insurance or make self-payment for services provided; and responsibility for payment of non-covered services.     Mode of Communication:  Video Conference via [employer]    As the provider I attest to compliance with applicable laws and regulations related to telemedicine.    Counselor verified Patient with 2-point verification: Patient is known to provider.    Video visit start time: 7:30 PM  Video visit end time: 8:03 PM    Length of session: 33 minutes    D: Writer met with PT following group session to develop treatment plan.  PT identified goals as:    1.Stop Gambling  2.Become a better version of myself  3.Increase mindfulness and coping skills    Writer and PT reviewed problems/ goals in each of the 6 dimensions identified.    I: Counselor facilitated 1:1 session.  A: PT presents with motivation to work towards treatment plan goals.  P: PT to attend group and work towards treatment plan goals.    Carla Goldstein, MAMADOU, [facility]      .

## 2022-12-22 NOTE — PROGRESS NOTES
*NO CHARGE/BILLING FOR GROUP - 2 patients attended group, no family members in attendance.    Family  Group Documentation     Was the patient seen in-person or via Telemedicine (if in-person skip to Group Note): Telemedicine    If Telemedicine: The patient's condition can be safely assessed and treated via synchronous audio and visual telemedicine encounter. ? ?      Reason for Telemedicine Visit: Services only offered telehealth    Originating Site (Patient Location): Patient's home    Distant Site (Provider Location): Provider Remote Setting- Home Office    Consent: ?The patient/guardian has verbally consented to: the potential risks and benefits of telemedicine (video visit) versus in person care; bill my insurance or make self-payment for services provided; and responsibility for payment of non-covered services.       Mode of Communication:??Video Conference via Zoom      As the provider I attest to compliance with applicable laws and regulations related to telemedicine.        Patient attended a video group session on the following days: ?          GROUP NOTE:  DATE OF SERVICE:  December 21, 2022    START TIME:  5:30 PM    END TIME:  7:30 PM    FACILITATOR(S):  MAMADOU Chen, [facility] [place]    TOPIC: BEH Group Therapy, Problem Gambling Group - Family    Number of patients attending the group: 2      In-person: 0      Video Conference:2  0 family members were also in attendance via video conference.    Group Length:   120 minutes    Group Therapy Type:  Problem Gambling Family Group    Group Attendance:  Client attended group.    Summary of Group / Topics Discussed: Group discussion was held on various recover related topics including discussing mindfulness practices from previously held group session.  Group discussed patterns in active addiction and recovery including feelings and defenses as well as connection in recovery as well as introducing various forms of journaling.     Patient's response to the  group topic/interactions:  PT appeared to gain insight into recovery related topics including the benefits of connection in recovery.    Client specific details:  PT actively participated in group discussion sharing his individual experience with mindfulness, emotions and connection.  Writer requested PT remain on screen following group, PT agreed.  Writer asked if PT would be available to develop treatment plan, PT agreed.  Please see individual treatment planning session note.

## 2022-12-22 NOTE — TELEPHONE ENCOUNTER
Email from Anna Agrawal, asking that I fax her gambling assessment. I have faxed and added note that to please contact me for further collaboration as needed.

## 2022-12-22 NOTE — TELEPHONE ENCOUNTER
Email from patient's mental health therapist:    Martín Campoverde, my name is Anna, I am meeting with Jose Antonio Krause for individual therapy sessions. He said that he has completed an JOSHUA with you and that you would be able to send it over.    I am also wondering if for his intake into the program, a diagnostic assessment or psychological evaluation was completed that you could send over for his file. He and I have worked together in the past but it as been over a year and so I will need to complete a diagnostic assessment if one was not completed for your program.     Thank you for your help and I hope you have a good holiday season,    Anna Agrawal M.A.   Mental Health Therapist  d. [phone]  [email or web address]    [facility]  [address] [place] [address]  [address] [facility], [address]        I responded to her email that I do have gambling assessment competed and if she would like that would she like me to email or fax it to her.       JOSHUA on file for mental health therapist.

## 2022-12-23 NOTE — PROGRESS NOTES
Group Therapy Documentation     Was the patient seen in-person or via Telemedicine (if in-person skip to Group Note): Telemedicine    If Telemedicine: The patient's condition can be safely assessed and treated via synchronous audio and visual telemedicine encounter. ? ?      Reason for Telemedicine Visit: Patient has requested telehealth visit    Originating Site (Patient Location): Patient's home    Distant Site (Provider Location): Provider Remote Setting- Home Office    Consent: ?The patient/guardian has verbally consented to: the potential risks and benefits of telemedicine (video visit) versus in person care; bill my insurance or make self-payment for services provided; and responsibility for payment of non-covered services.       Mode of Communication:??Video Conference via Zoom      As the provider I attest to compliance with applicable laws and regulations related to telemedicine.        Patient attended a video group session on the following days: ?          GROUP NOTE:  DATE OF SERVICE:  December 22, 2022    START TIME:  5:30pm    END TIME:  7:17pm    Group Length:   107 minutes     FACILITATOR(S):    Nirali CEDILLO CGC    TOPIC: BEH Group Therapy, Problem Gambling Group     Number of patients attending the group: 5    Group Therapy Type:  Problem Gambling Group    Group Attendance:  Client attended group     Summary of Group / Topics Discussed:   Group started with a review of Tuesdays group topics. Then each group member took turns sharing for check in and identifying two feelings. Writer shared with group the last of the assignment we started on Tuesday: A Guide to Managing Stress followed by discussion. Writer shared with group an article from the [facility]: Using Mindful Communication to Navigate Holiday Conflicts. Group members watched two short videos from [employer]: Inside the Brain of a Gambling Addict and Your Brain on [employer] but  "followed by discussion. Group members participated in answering different questions from the question cubes. Group ended with a mindful reading/task: Write a Mindful Renee list.      Patient's response to the group topic/interactions:    Patient appeared to gain awareness with how the brain reacts to gambling. Leaning how to deal with stress and stressful situations and information about other addictions.       Client specific details:    Patient shared he is finding it difficult to fill time since being off from work for a few days. Patient shared he is working to be productive while \"taking it easy\".       Nirali Briceño LADC, [facility]  "

## 2022-12-23 NOTE — PROGRESS NOTES
"Patient:  Jose Antonio Krause                         Date of Assessment: 12/09/2022            Problem Gambling Progress Note and Treatment Plan Review     Attendance  Group/Individual: Phase I    Groups Attended: 12/13/22, 12/14/22, 12/15/22, 12/20/22, 12/21/22, 12/22/22    Support group attended this week: no    Reporting sobriety:  yes    Client Treatment Goals:   1.\"Stop gambling\"  2.\"Getting rid of depression\"  3.\"Builiding a healthier mind frame\"    Treatment Plan     Treatment Plan Review competed on: December 23, 2022    Staff Members contributing:  Carla Goldstein, MAMADOU, [facility] & MAMADOU Nelson, [facility]                         Received Supervision:  yes    Client: contributed to goals and plan.  Client received copy of plan/revised plan: Yes  Client agrees with plan/revised plan: Yes    Changes to Treatment Plan: No  New Goals added since last review:  No additional goals added at this time.    Goals worked on since last review:  Dimension 1 Patient reports no gambling at this time.   Dimension 2 Patient reports ability to navigate the healthcare system as needed.   Dimension 3 Patient attended groups on topics: Strengthening Communication and Coping Skills. Also reading and discussing \"A Guide to Managing Stress\" and mindfulness practices. Patient reports current mental health therapist.   Dimension 4 Patient attends group and patient participates in discussion in group.   Dimension 5 Patient attended group showing videos: Inside the Brain of a Gambling Addict and Your Brain on [employer] and added to discussion.   Dimension 6 Patient attended group with topics: Life Balance Wheel and Ways to Reduce Screen Time. Patient attends family group on Wednesdays    Strategies effective: yes    Strategies need these changes: Continue to build supports in the community and work on treatment plan goals.     Depression and Anxiety at Intake:   Patient's PHQ-9 score was 16 out of 27, indicating moderately severe " depression.  Patient's CAROLANN-7 score was 13, indicating moderate anxiety.     Intake Dimension Ratings:    Dimension 1  0    Dimension 2  0    Dimension 3  2    Dimension 4  0    Dimension 5  3    Dimension 6  0      Guide to Risk Ratings for Suicidality:   IDEATION: Active thoughts of suicide? INTENT: Intent to follow on suicide? PLAN: Plan to follow through on suicide? Level of Risk:   IF Yes Yes Yes Patient = High Emergent   IF Yes Yes No Patient = High Urgent/Non-Emergent   IF Yes No No Patient = Moderate Non-Urgent   IF No No   No Patient = Low Risk   The patient's ADDITIONAL RISK FACTORS and lack of PROTECTIVE FACTORS may increase their overall suicide risk ratings.     Patient's/client's current risk rating:  Low Risk    Safety Plan on File  No risk identified    Family Involvement:   none schedule this week    Data:   good insight client did participate      Intervention:   Counselor feedback  Education  Group feedback      Assessment:   Stages of Change Model  Action    Appears/Sounds:  Cooperative  Engaged      Plan:  Focus on recovery environment  Monitor emotional/physical health        Nirali CEDILLO, CGC

## 2022-12-27 ENCOUNTER — HOSPITAL ENCOUNTER (OUTPATIENT)
Dept: BEHAVIORAL HEALTH | Facility: CLINIC | Age: 42
Discharge: HOME | End: 2022-12-27
Attending: FAMILY MEDICINE
Payer: COMMERCIAL

## 2022-12-27 PROCEDURE — 90853 GROUP PSYCHOTHERAPY: CPT | Mod: GT

## 2022-12-28 ENCOUNTER — HOSPITAL ENCOUNTER (OUTPATIENT)
Dept: BEHAVIORAL HEALTH | Facility: CLINIC | Age: 42
Discharge: HOME | End: 2022-12-28
Attending: FAMILY MEDICINE
Payer: COMMERCIAL

## 2022-12-28 ENCOUNTER — TELEPHONE (OUTPATIENT)
Dept: BEHAVIORAL HEALTH | Facility: CLINIC | Age: 42
End: 2022-12-28

## 2022-12-28 PROCEDURE — 90832 PSYTX W PT 30 MINUTES: CPT | Mod: GT

## 2022-12-28 NOTE — PROGRESS NOTES
"Group Therapy Documentation     Was the patient seen in-person or via Telemedicine (if in-person skip to Group Note): Telemedicine    If Telemedicine: The patient's condition can be safely assessed and treated via synchronous audio and visual telemedicine encounter. ? ?      Reason for Telemedicine Visit: Patient has requested telehealth visit    Originating Site (Patient Location): Patient's home    Distant Site (Provider Location): Provider Remote Setting- Home Office    Consent: ?The patient/guardian has verbally consented to: the potential risks and benefits of telemedicine (video visit) versus in person care; bill my insurance or make self-payment for services provided; and responsibility for payment of non-covered services.       Mode of Communication:??Video Conference via Zoom      As the provider I attest to compliance with applicable laws and regulations related to telemedicine.        Patient attended a video group session on the following days: ?          GROUP NOTE:  DATE OF SERVICE:  December 27, 2022    START TIME:  5:30pm    END TIME:  7:25 PM    Group Length:   115 minutes    FACILITATOR(S):    MAMADOU Chen, [facility] [place]    TOPIC: BEH Group Therapy, Problem Gambling Group     Number of patients attending the group: 9    Group Therapy Type:  Problem Gambling Group    Group Attendance:  Client attended group     Summary of Group / Topics Discussed: Group members checked in with events from the weekend and holidays as well as introductions to two new group members.  Group discussed stressors and stress management strategies used by group members.  Group discussion was held on ways to express and receiving gratitude as well as connection with others, including strengthening relationships and trust and happiness.  Group watched short video \"[place] in Gratitude\" with discussion to follow.  Group closed with a reading.     Patient's response to the group topic/interactions:  PT appeared to gain " insight into the benefits of expressing gratitude in recovery.      Client specific details:   PT shared experiencing stressors, reporting he utilizing breathing, changing his perspective and seeking support from family.  PT actively engaged in group discussion.     Carla Goldstein, MAMADOU, CGC

## 2022-12-28 NOTE — TELEPHONE ENCOUNTER
Call to patient's cell, I asked patient if he would be alright with an individual session tonight instead of group since only two people are scheduled. Patient said yes this would be fine. I will have individual session with patient at 5:30pm tonight.

## 2022-12-29 ENCOUNTER — HOSPITAL ENCOUNTER (OUTPATIENT)
Dept: BEHAVIORAL HEALTH | Facility: CLINIC | Age: 42
Discharge: HOME | End: 2022-12-29
Attending: FAMILY MEDICINE
Payer: COMMERCIAL

## 2022-12-29 PROCEDURE — 90853 GROUP PSYCHOTHERAPY: CPT | Mod: 95

## 2022-12-29 NOTE — ADDENDUM NOTE
Encounter addended by: Nirali [facility] on: 12/28/2022 7:19 PM   Actions taken: Clinical Note Signed

## 2022-12-29 NOTE — PROGRESS NOTES
Individual counseling session:    Telemedicine Visit: The patient's condition can be safely assessed and treated via synchronous audio and visual telemedicine encounter.      Reason for Telemedicine Visit: Patient has requested telehealth visit    Originating Site (Patient Location): Patient's home    Distant Site (Provider Location): Provider Remote Setting- Home Office    Consent:  The patient/guardian has verbally consented to: the potential risks and benefits of telemedicine (video visit) versus in person care; bill my insurance or make self-payment for services provided; and responsibility for payment of non-covered services.     Mode of Communication:  Video Conference via Zoom    As the provider I attest to compliance with applicable laws and regulations related to telemedicine.    Counselor verified Patient with 2-point verification: Patient is known to provider.    Video visit start time: 5:25pm  Video visit end time: 5:55pm    Length of session: 30 minutes    D: Writer and patient met for an individual counseling session due to only two patients able to attend group session. Writer and patient discussed patient's Christmas holiday with family, gambling thoughts and urges. Patient shared with writer that he is no longer looking to attend inpatient services for compulsive gambling at [facility]. Patient shared he would like to stay in out programing. Patient shared he would like to help others in the future with compulsive gambling. Writer shared a little about peer support.   I: Counselor facilitated 1:1 session.  A: Patient appears to continue to work on treatment goals.   P: Patient to attend group tomorrow night and groups as well as individual sessions in the future.       Nirali CEDILLO, CGC

## 2022-12-30 NOTE — ADDENDUM NOTE
Encounter addended by: Nirali Briceño [facility] on: 12/30/2022 10:34 AM   Actions taken: Clinical Note Signed

## 2022-12-30 NOTE — PROGRESS NOTES
"Patient:  Jose Antonio Krause                         Date of Assessment: 12/09/2022            Problem Gambling Progress Note and Treatment Plan Review     Attendance  Group/Individual: Phase I      Groups Attended: 12/27/22, 12/29/22 and individual session on 12/28/22    Support group attended this week: no    Reporting sobriety:  yes    Client Treatment Goals:   1.\"Stop gambling\"  2.\"Getting rid of depression\"  3.\"Builiding a healthier mind frame\"      Treatment Plan     Treatment Plan Review competed on: December 30, 2022    Staff Members contributing:  Carla Goldstein, [facility], [facility] & MAMADOU Nelson, [facility]                         Received Supervision:  yes    Client: contributed to goals and plan.  Client received copy of plan/revised plan: Yes  Client agrees with plan/revised plan: Yes    Changes to Treatment Plan: No  New Goals added since last review:  No additional goals added at this time.    Goals worked on since last review:  Dimension 1 No reports of gambling.   Dimension 2 No reports of changes in health.   Dimension 3 Patient shared using breathing coping skills to regulate emotions.   Dimension 4 Patient attended group with topic: expressing and receiving gratitude.   Dimension 5 Patient attended groups with topics: stressors and stress management strategies and Relapse: identifying warning signs, identifying the problem, finding tools and coping skills to deal with warning signs  Dimension 6 Patient continues to strengthen supports with family and friends.     Strategies effective: yes    Strategies need these changes:   Continue to build supports in the community and work on treatment plan goals.        Depression and Anxiety at Intake:   Patient's PHQ-9 score was 16 out of 27, indicating moderately severe depression.  Patient's CAROLANN-7 score was 13, indicating moderate anxiety.        Intake Dimension Ratings:    Dimension 1  0    Dimension 2  0    Dimension 3  2    Dimension 4  0    Dimension 5  3   "  Dimension 6  0      Guide to Risk Ratings for Suicidality:   IDEATION: Active thoughts of suicide? INTENT: Intent to follow on suicide? PLAN: Plan to follow through on suicide? Level of Risk:   IF Yes Yes Yes Patient = High Emergent   IF Yes Yes No Patient = High Urgent/Non-Emergent   IF Yes No No Patient = Moderate Non-Urgent   IF No No   No Patient = Low Risk   The patient's ADDITIONAL RISK FACTORS and lack of PROTECTIVE FACTORS may increase their overall suicide risk ratings.     Patient's/client's current risk rating:  Low Risk    Safety Plan on File  No risk identified    Family Involvement:   none schedule this week    Data:   client did participate      Intervention:   Counselor feedback  Education  Group feedback      Assessment:   Stages of Change Model  Action    Appears/Sounds:  Motivated  Engaged      Plan:  Focus on recovery environment  Monitor emotional/physical health      Nirali CEDILLO, CGC

## 2022-12-30 NOTE — PROGRESS NOTES
Group Therapy Documentation     Was the patient seen in-person or via Telemedicine (if in-person skip to Group Note): Telemedicine    If Telemedicine: The patient's condition can be safely assessed and treated via synchronous audio and visual telemedicine encounter. ? ?      Reason for Telemedicine Visit: Patient has requested telehealth visit    Originating Site (Patient Location): Patient's home    Distant Site (Provider Location): Provider Remote Setting- Home Office    Consent: ?The patient/guardian has verbally consented to: the potential risks and benefits of telemedicine (video visit) versus in person care; bill my insurance or make self-payment for services provided; and responsibility for payment of non-covered services.       Mode of Communication:??Video Conference via Zoom      As the provider I attest to compliance with applicable laws and regulations related to telemedicine.        Patient attended a video group session on the following days: ?          GROUP NOTE:  DATE OF SERVICE:  December 29, 2022    START TIME:  5:30pm    END TIME:  7:20pm    Group Length:   110 minutes    FACILITATOR(S):    Nirali CEDILLO CGC    TOPIC: BEH Group Therapy, Problem Gambling Group     Number of patients attending the group: 9    Group Therapy Type:  Problem Gambling Group    Group Attendance:  Client attended group     Summary of Group / Topics Discussed:   Group started with announcements/reminders: Hybrid group will be next Thursday in-person and virtual, more information will be emailed out at the beginning of next week. Writer and group went over updated group rules, including the absence policy. Writer shared with group The Calm el and Hallow el. Group members took turns with check in and identifying two feelings. Group discussion was held on Relapse: identifying warning signs, identifying the problem, finding tools and coping skills to deal with warning signs. Group ended with a mindfulness task reading.       Patient's response to the group topic/interactions:    Patient appeared to gain knowledge and awareness of Relapse warning signs and healthy ways to deal with them.       Client specific details:    Patient was an active member in group and in discussions. Patient shared his struggles with group being new in recovery.       Nirali CEDILLO, [facility]

## 2023-01-03 ENCOUNTER — HOSPITAL ENCOUNTER (OUTPATIENT)
Dept: BEHAVIORAL HEALTH | Facility: CLINIC | Age: 43
Discharge: HOME | End: 2023-01-03
Attending: FAMILY MEDICINE
Payer: COMMERCIAL

## 2023-01-03 PROCEDURE — 90853 GROUP PSYCHOTHERAPY: CPT | Mod: GT

## 2023-01-04 ENCOUNTER — HOSPITAL ENCOUNTER (OUTPATIENT)
Dept: BEHAVIORAL HEALTH | Facility: CLINIC | Age: 43
Discharge: HOME | End: 2023-01-04
Attending: FAMILY MEDICINE
Payer: COMMERCIAL

## 2023-01-04 PROCEDURE — 90853 GROUP PSYCHOTHERAPY: CPT | Mod: GT

## 2023-01-05 ENCOUNTER — HOSPITAL ENCOUNTER (OUTPATIENT)
Dept: BEHAVIORAL HEALTH | Facility: CLINIC | Age: 43
Discharge: HOME | End: 2023-01-05
Attending: FAMILY MEDICINE
Payer: COMMERCIAL

## 2023-01-05 PROCEDURE — 90853 GROUP PSYCHOTHERAPY: CPT | Mod: GT

## 2023-01-05 NOTE — PROGRESS NOTES
Family  Group Documentation     Was the patient seen in-person or via Telemedicine (if in-person skip to Group Note): Telemedicine    If Telemedicine: The patient's condition can be safely assessed and treated via synchronous audio and visual telemedicine encounter. ? ?      Reason for Telemedicine Visit: Services only offered telehealth    Originating Site (Patient Location): Patient's home    Distant Site (Provider Location): Provider Remote Setting- Home Office    Consent: ?The patient/guardian has verbally consented to: the potential risks and benefits of telemedicine (video visit) versus in person care; bill my insurance or make self-payment for services provided; and responsibility for payment of non-covered services.       Mode of Communication:??Video Conference via Zoom      As the provider I attest to compliance with applicable laws and regulations related to telemedicine.        Patient attended a video group session on the following days: ?          GROUP NOTE:  DATE OF SERVICE:  January 4, 2023    START TIME:  5:30 PM    END TIME:  7:22 PM    FACILITATOR(S):  MAMADOU [place], [facility]    TOPIC: BEH Group Therapy, Problem Gambling Group - Family    Number of patients attending the group: 4      Video Conference: 4  0 family members were also in attendance via video conference.    Group Length:   112 minutes    Group Therapy Type:  Problem Gambling Family Group    Group Attendance:  Client attended group.    Summary of Group / Topics Discussed: Group checked in sharing any recent events and sharing current feelings.  Group topic discussed the biology of change, change in recovery, barriers to change and changing relationships including responses and relationship building in recovery.  Group discussed pattern development including thoughts and behaviors.  Counselor discussed stages of change, including pre-contemplation, contemplation, preparation, action, maintenance as well as various actions  supporting change.     Patient's response to the group topic/interactions: Patient appeared to gain insight into the biology of change and various strategies to support change including feeling recognition.      Client specific details:   PT shared experiencing some external stressors, reporting continued motivation and efforts in his recovery.  PT actively shared in group discussion and informed writer he is interested in inviting family members to family group.    Carla Goldstein, [facility], CGC

## 2023-01-06 NOTE — PROGRESS NOTES
Group Therapy Documentation     Was the patient seen in-person or via Telemedicine (if in-person skip to Group Note): Telemedicine    If Telemedicine: The patient's condition can be safely assessed and treated via synchronous audio and visual telemedicine encounter. ? ?      Reason for Telemedicine Visit: Patient has requested telehealth visit    Originating Site (Patient Location): Patient's home    Distant Site (Provider Location): Provider Remote Setting- Home Office    Consent: ?The patient/guardian has verbally consented to: the potential risks and benefits of telemedicine (video visit) versus in person care; bill my insurance or make self-payment for services provided; and responsibility for payment of non-covered services.       Mode of Communication:??Video Conference via Zoom      As the provider I attest to compliance with applicable laws and regulations related to telemedicine.        Patient attended a video group session on the following days: ?          GROUP NOTE:  DATE OF SERVICE:  January 5, 2023    START TIME:  5:30pm    END TIME:  7:21pm    Group Length:   111 minutes    FACILITATOR(S):    Nirali CEDILLO CGC     TOPIC: BEH Group Therapy, Problem Gambling Group     Number of patients attending the group: 9    Group Therapy Type:  Problem Gambling Group    Group Attendance:  Client attended group     Summary of Group / Topics Discussed:   Group started with a recap from Tuesday group and last night s Family group. Group members watched the short videos: Monkey Mind: Anxiety, Anger, Depression Explained episodes 2 and 3 followed by discussion on the importance of sleep, owning emotions and accepting emotions. Writer shared current emotions list and three new one s group members can reference. Writer and group members went through hand outs and continued with discussion on feelings and emotions. All connecting with Dimension III.     Patient's response to the group topic/interactions:    Patient  appeared to gain more understanding of identifying emotions.       Client specific details:     Patient shared with group emotions he felt before and after gambling became out of control. Patient also shared a time when he felt guilty.       Nirali Briceño [facility], CGC

## 2023-01-06 NOTE — PROGRESS NOTES
"Patient:  Jose Antonio Krause                         Date of Assessment: 12/09/2022            Problem Gambling Progress Note and Treatment Plan Review     Attendance  Group/Individual: Phase I      Groups Attended: 01/03/23, 01/04/23, 01/05/23    Support group attended this week: no    Reporting sobriety:  yes    Client Treatment Goals:   1.\"Stop gambling\"  2.\"Getting rid of depression\"  3.\"Builiding a healthier mind frame\"      Treatment Plan     Treatment Plan Review competed on: January 6, 2023    Staff Members contributing:  Carla Goldstein, [facility] [place], [facility] [place] [facility] & MAMADOU Nelson, [facility] [place]                         Received Supervision:  yes    Client: contributed to goals and plan.  Client received copy of plan/revised plan: Yes  Client agrees with plan/revised plan: Yes    Changes to Treatment Plan: No  New Goals added since last review:  No additional goals added at this time.      Goals worked on since last review:  Dimension 1 No gambling reported.   Dimension 2 No reports of change.   Dimension 3 Patient attended groups with videos and discussion on: Understand and Manage Your Monkey Mind - Anxiety, Anger, Depression explained parts 1-3. Also, Emotion lists and feelings wheel.  Dimension 4 Patient attended family group with topic: Biology of change.  Dimension 5 Patient attended group with continued information on Triggers and Relapse.  Dimension 6 Patient reaching out to family members to join family group.     Strategies effective: yes    Strategies need these changes:   Continue to build supports in the community and work on treatment plan goals.        Depression and Anxiety at Intake:   Patient's PHQ-9 score was 16 out of 27, indicating moderately severe depression.  Patient's CAROLANN-7 score was 13, indicating moderate anxiety.        Intake Dimension Ratings:    Dimension 1  0    Dimension 2  0    Dimension 3  2    Dimension 4  0    Dimension 5  3    Dimension 6  0      Guide to Risk Ratings for Suicidality: "   IDEATION: Active thoughts of suicide? INTENT: Intent to follow on suicide? PLAN: Plan to follow through on suicide? Level of Risk:   IF Yes Yes Yes Patient = High Emergent   IF Yes Yes No Patient = High Urgent/Non-Emergent   IF Yes No No Patient = Moderate Non-Urgent   IF No No   No Patient = Low Risk   The patient's ADDITIONAL RISK FACTORS and lack of PROTECTIVE FACTORS may increase their overall suicide risk ratings.     Patient's/client's current risk rating:  Low Risk    Safety Plan on File  No risk identified    Family Involvement:   none schedule this week    Data:   good insight client did actively participate      Intervention:   Education  Group feedback      Assessment:   Stages of Change Model  Action    Appears/Sounds:  Cooperative  Motivated  Engaged      Plan:  Focus on recovery environment  Monitor emotional/physical health      Nirali CEDILLO, CGC

## 2023-01-06 NOTE — ADDENDUM NOTE
Encounter addended by: Nirali Briceño [facility] on: 1/6/2023 9:56 AM   Actions taken: Clinical Note Signed

## 2023-01-10 ENCOUNTER — HOSPITAL ENCOUNTER (OUTPATIENT)
Dept: BEHAVIORAL HEALTH | Facility: CLINIC | Age: 43
Discharge: HOME | End: 2023-01-10
Attending: FAMILY MEDICINE
Payer: COMMERCIAL

## 2023-01-10 PROCEDURE — 90853 GROUP PSYCHOTHERAPY: CPT | Mod: 95

## 2023-01-11 ENCOUNTER — HOSPITAL ENCOUNTER (OUTPATIENT)
Dept: BEHAVIORAL HEALTH | Facility: CLINIC | Age: 43
Discharge: HOME | End: 2023-01-11
Attending: FAMILY MEDICINE
Payer: COMMERCIAL

## 2023-01-11 PROCEDURE — 90853 GROUP PSYCHOTHERAPY: CPT | Mod: GT

## 2023-01-11 NOTE — PROGRESS NOTES
"Group Therapy Documentation     Was the patient seen in-person or via Telemedicine (if in-person skip to Group Note): Telemedicine    If Telemedicine: The patient's condition can be safely assessed and treated via synchronous audio and visual telemedicine encounter. ? ?      Reason for Telemedicine Visit: Patient has requested telehealth visit    Originating Site (Patient Location): Patient's home    Distant Site (Provider Location): Provider Remote Setting- Home Office    Consent: ?The patient/guardian has verbally consented to: the potential risks and benefits of telemedicine (video visit) versus in person care; bill my insurance or make self-payment for services provided; and responsibility for payment of non-covered services.       Mode of Communication:??Video Conference via Zoom      As the provider I attest to compliance with applicable laws and regulations related to telemedicine.        Patient attended a video group session on the following days: ?          GROUP NOTE:  DATE OF SERVICE:  January 10, 2023    START TIME:  5:30pm    END TIME:  7:20pm    Group Length:   110 minutes    FACILITATOR(S):    Nirali CEDILLO [facility] [place]    TOPIC: BEH Group Therapy, Problem Gambling Group     Number of patients attending the group: 10    Group Therapy Type:  Problem Gambling Group    Group Attendance:  Client attended group     Summary of Group / Topics Discussed:   Group started with each patient sharing a check in of the week and identifying two feelings. Group watched a short video on \"Post Secrets\". How people can share their secrets anonymously. This led into discussion about secrets and how learning about others brings connection for dimension 6. Group started on a discussion on Values for dimension 4 . Comparing the values, you had before gambling took over and the values you have now. How have your values changed? Group ended with a mindfulness reading/task to look outside and become aware of all the things you see. "      Patient's response to the group topic/interactions:    Patient appeared to gain awareness on connection and values.       Client specific details:    Patient shared that due to another group member he was introduced to a couple of other people in recovery. Patient also shared that he started attending online GA meetings and is now looking to get a sponsor.       Nirali CEDILLO, CGC

## 2023-01-12 ENCOUNTER — HOSPITAL ENCOUNTER (OUTPATIENT)
Dept: BEHAVIORAL HEALTH | Facility: CLINIC | Age: 43
Discharge: HOME | End: 2023-01-12
Attending: FAMILY MEDICINE
Payer: COMMERCIAL

## 2023-01-12 PROCEDURE — 90853 GROUP PSYCHOTHERAPY: CPT | Mod: GT

## 2023-01-12 NOTE — PROGRESS NOTES
Family  Group Documentation      Was the patient seen in-person or via Telemedicine (if in-person skip to Group Note): Telemedicine     If Telemedicine: The patient's condition can be safely assessed and treated via synchronous audio and visual telemedicine encounter. ? ?       Reason for Telemedicine Visit: Services only offered telehealth     Originating Site (Patient Location): Patient's home     Distant Site (Provider Location): Provider Remote Setting- Home Office     Consent: ?The patient/guardian has verbally consented to: the potential risks and benefits of telemedicine (video visit) versus in person care; bill my insurance or make self-payment for services provided; and responsibility for payment of non-covered services.        Mode of Communication:??Video Conference via Zoom      As the provider I attest to compliance with applicable laws and regulations related to telemedicine.        Patient attended a video group session on the following days: ?           GROUP NOTE:  DATE OF SERVICE:  January 11, 2023     START TIME:  5:30 PM     END TIME:  7:16 PM     FACILITATOR(S):  MAMADOU Chen, [facility]     TOPIC: BEH Group Therapy, Problem Gambling Group - Family     Number of patients attending the group: 4      In-person: 0      Video Conference:4  1 family members were also in attendance via video conference.     Group Length:   106 minutes     Group Therapy Type:  Problem Gambling Family Group     Group Attendance:  Client attended group 4     Summary of Group / Topics Discussed: Group members and family began with introductions.  Group discussion was held on topics including guilt and shame and fueling factors of shame including secrecy, silence and judgements, in addition to some experiences of shame from a non-chemical addiction perspective.  Group members and family shared in discussion on vulnerability, trust, empathy, connection and communication as well as building close and familial  relationships.     Patient's response to the group topic/interactions:  PT appeared to gain insight into the effects of shame and exploring alternative such as allowing empathy and vulnerability with others.      Client specific details: PT actively shared in group discussion sharing his experiences with guilt and shame as well as his progress with vulnerability and expressing his emotions to others.    Carla Goldstein, MAMADOU, CGC

## 2023-01-13 NOTE — PROGRESS NOTES
"Group Therapy Documentation     Was the patient seen in-person or via Telemedicine (if in-person skip to Group Note): Telemedicine    If Telemedicine: The patient's condition can be safely assessed and treated via synchronous audio and visual telemedicine encounter. ? ?      Reason for Telemedicine Visit: Patient has requested telehealth visit    Originating Site (Patient Location): Patient's home    Distant Site (Provider Location): Provider Remote Setting- Home Office    Consent: ?The patient/guardian has verbally consented to: the potential risks and benefits of telemedicine (video visit) versus in person care; bill my insurance or make self-payment for services provided; and responsibility for payment of non-covered services.       Mode of Communication:??Video Conference via Zoom      As the provider I attest to compliance with applicable laws and regulations related to telemedicine.        Patient attended a video group session on the following days: ?          GROUP NOTE:  DATE OF SERVICE:  January 12, 2023    START TIME:  5:30pm    END TIME:  7:20pm    Group Length:   110 minutes    FACILITATOR(S):    Nirali CEDILLO [facility], [facility] [place]    TOPIC: BEH Group Therapy, Problem Gambling Group     Number of patients attending the group: 8    Group Therapy Type:  Problem Gambling Group    Group Attendance:  Client attended group     Summary of Group / Topics Discussed:   Group started with a speaker from [employer]. Speaker shared his story and had discussion with group on many topics pertaining to compulsive gambling. Working on treatment goals from Dimension 6. Writer showed a short video to the group from an interview with the Augusta Giang asking him \"What is the cure for anxiety\". Group members participated in discussion on altruism. Sharing different ways, they have helped others and ways they can help in the future. Group ended with a mindfulness task reading.      Patient's response to the group topic/interactions:  "   Patient appeared to gain more awareness on long term recovery and altruism.       Client specific details:    Patient was and active participant in group and discussions. Patient discussed with speaker about his current attendance in GA meetings.       Nirali CEDILLO, CGC

## 2023-01-17 ENCOUNTER — HOSPITAL ENCOUNTER (OUTPATIENT)
Dept: BEHAVIORAL HEALTH | Facility: CLINIC | Age: 43
Discharge: HOME | End: 2023-01-17
Attending: FAMILY MEDICINE
Payer: COMMERCIAL

## 2023-01-17 PROCEDURE — 90853 GROUP PSYCHOTHERAPY: CPT | Mod: GT

## 2023-01-17 NOTE — ADDENDUM NOTE
Encounter addended by: Nirali Briceño [facility] on: 1/17/2023 12:43 PM   Actions taken: Clinical Note Signed

## 2023-01-17 NOTE — PROGRESS NOTES
"Patient:  Jose Antonio Krause                         Date of Assessment: 12/09/2022            Problem Gambling Progress Note and Treatment Plan Review     Attendance  Group/Individual: Phase I      Groups Attended:  01/10/23, 01/11/23, 01/12/23    Support group attended this week: yes    Reporting sobriety:  yes    Client Treatment Goals:   1.\"Stop gambling\"  2.\"Getting rid of depression\"  3.\"Builiding a healthier mind frame\"      Treatment Plan     Treatment Plan Review competed on: January 17, 2023    Staff Members contributing:  Carla Goldstein, [facility], [facility] & MAMADOU Nelson, [facility]                         Received Supervision:  yes    Client: contributed to goals and plan.  Client received copy of plan/revised plan: Yes  Client agrees with plan/revised plan: Yes    Changes to Treatment Plan: No  New Goals added since last review:  No additional goals added at this time.      Goals worked on since last review:  Dimension 1 No reports of gambling.   Dimension 2 Patient reports ability to navigate the healthcare system as needed.  Dimension 3 Patient attended group with topics: guilt and shame.   Dimension 4 Patient attended group with topic: Values and added to discussion.   Dimension 5  Patient attended group with Video and discussion on \"Post Secrets\"  Dimension 6 Patient attended group with GA speaker and discussion topic on Altruism. Patient shared with group now attending GA Meetings.       Strategies effective: yes    Strategies need these changes:   Continue to build supports in the community and work on treatment plan goals.        Depression and Anxiety at Intake:   Patient's PHQ-9 score was 16 out of 27, indicating moderately severe depression.  Patient's CAROLANN-7 score was 13, indicating moderate anxiety.     Intake Dimension Ratings:    Dimension 1  0    Dimension 2  0    Dimension 3  2    Dimension 4  0    Dimension 5  3    Dimension 6  0      Guide to Risk Ratings for Suicidality:   IDEATION: Active " thoughts of suicide? INTENT: Intent to follow on suicide? PLAN: Plan to follow through on suicide? Level of Risk:   IF Yes Yes Yes Patient = High Emergent   IF Yes Yes No Patient = High Urgent/Non-Emergent   IF Yes No No Patient = Moderate Non-Urgent   IF No No   No Patient = Low Risk   The patient's ADDITIONAL RISK FACTORS and lack of PROTECTIVE FACTORS may increase their overall suicide risk ratings.     Patient's/client's current risk rating:  Low Risk      Safety Plan on File  No risk identified    Family Involvement:   none schedule this week    Data:   client did actively participate      Intervention:   Education  Group feedback      Assessment:   Stages of Change Model  Action    Appears/Sounds:  Engaged      Plan:  Focus on recovery environment  Monitor emotional/physical health      Nirali CEDILLO, CGC

## 2023-01-18 ENCOUNTER — HOSPITAL ENCOUNTER (OUTPATIENT)
Dept: BEHAVIORAL HEALTH | Facility: CLINIC | Age: 43
Discharge: HOME | End: 2023-01-18
Attending: FAMILY MEDICINE
Payer: COMMERCIAL

## 2023-01-18 PROCEDURE — 90853 GROUP PSYCHOTHERAPY: CPT | Mod: 95

## 2023-01-18 NOTE — PROGRESS NOTES
"Group Therapy Documentation     Was the patient seen in-person or via Telemedicine (if in-person skip to Group Note): Telemedicine    If Telemedicine: The patient's condition can be safely assessed and treated via synchronous audio and visual telemedicine encounter. ? ?      Reason for Telemedicine Visit: Services only offered telehealth    Originating Site (Patient Location): Patient's home    Distant Site (Provider Location): Provider Remote Setting- Home Office    Consent: ?The patient/guardian has verbally consented to: the potential risks and benefits of telemedicine (video visit) versus in person care; bill my insurance or make self-payment for services provided; and responsibility for payment of non-covered services.       Mode of Communication:??Video Conference via Zoom      As the provider I attest to compliance with applicable laws and regulations related to telemedicine.        Patient attended a video group session on the following days: ?          GROUP NOTE:  DATE OF SERVICE:  January 17, 2023    START TIME:  5:30pm    END TIME:  7:18pm    Group Length:   108 minutes    FACILITATOR(S):    Nirali CEDILLO CGC    TOPIC: BEH Group Therapy, Problem Gambling Group     Number of patients attending the group: 12    Group Therapy Type:  Problem Gambling Group    Group Attendance:  Client attended group     Summary of Group / Topics Discussed:   Group started with introductions to two new group members. All group members took turn with introduction sharing information about themselves and about their gambling. Group continued with a check in and identifying two feelings from all group members. Discussion was held on the \"Emotional Meaning of Money\" (Dimension 3). Patients identified positive and negative words they associate with the work money. Group ended with a mindfulness reading on \"Give Yourself a Break\".      Patient's response to the group topic/interactions:    Patient appeared to gain understanding in " the emotions attached to money.       Client specific details:    Patient shared he had a stressful weekend and how he used many of his coping skills, so he would not walker. Patient did ask if he could speak with writer after group. Patient and writer met from 7:18-7:26. Patient shared in more detail his stressors and shared how talking about it had helped and thanked writer. Patient reported he would follow up with mental health provider and continue to attend GA meetings.     Nirali CEDILLO, [facility]

## 2023-01-19 ENCOUNTER — HOSPITAL ENCOUNTER (OUTPATIENT)
Dept: BEHAVIORAL HEALTH | Facility: CLINIC | Age: 43
Discharge: HOME | End: 2023-01-19
Attending: FAMILY MEDICINE
Payer: COMMERCIAL

## 2023-01-19 PROCEDURE — 90853 GROUP PSYCHOTHERAPY: CPT | Mod: GT

## 2023-01-19 NOTE — PROGRESS NOTES
Family  Group Documentation     Was the patient seen in-person or via Telemedicine (if in-person skip to Group Note): Telemedicine    If Telemedicine: The patient's condition can be safely assessed and treated via synchronous audio and visual telemedicine encounter. ? ?      Reason for Telemedicine Visit: Services only offered telehealth    Originating Site (Patient Location): Patient's home    Distant Site (Provider Location): Provider Remote Setting- Home Office    Consent: ?The patient/guardian has verbally consented to: the potential risks and benefits of telemedicine (video visit) versus in person care; bill my insurance or make self-payment for services provided; and responsibility for payment of non-covered services.       Mode of Communication:??Video Conference via Zoom      As the provider I attest to compliance with applicable laws and regulations related to telemedicine.        Patient attended a video group session on the following days: ?          GROUP NOTE:  DATE OF SERVICE:  January 18, 2023    START TIME:  5:30 PM    END TIME:  7:16 PM    FACILITATOR(S):  MAMADOU Chen, [facility]    TOPIC: BEH Group Therapy, Problem Gambling Group - Family    Number of patients attending the group: 6  1 family members were also in attendance via video conference.    Group Length:   106 minutes    Group Therapy Type:  Problem Gambling Family Group    Group Attendance:  Client attended group.    Summary of Group / Topics Discussed: Group began with introductions.  Group topic discussed the progression and effects of compulsive gambling behaviors on individuals and partners/families.  Discussion was held on phases of gambling and the impact on family relationships.  Also discussed phases of recovery for both individual and partners/familes.  Group discussed communication styles including passive, passive-aggressive, aggressive and utilizing assertive communication in recovery and having difficult  conversations.     Patient's response to the group topic/interactions:  PT appeared to gain insight into the individual and relational effects of gambling and communication styles.      Client specific details: PT actively engaged in group discussion, sharing his progression, recovery and rebuilding connections and communication with family members.    Carla Goldstein, [facility], CGC

## 2023-01-19 NOTE — PROGRESS NOTES
"Patient:  Jose Antonio Krause                         Date of Assessment: 12/09/2022            Problem Gambling Progress Note and Treatment Plan Review     Attendance  Group/Individual: Phase I      Groups Attended: 01/17/23,01/18/23, 01/19/23    Support group attended this week: yes    Reporting sobriety:  yes    Client Treatment Goals:   1.\"Stop gambling\"  2.\"Getting rid of depression\"  3.\"Builiding a healthier mind frame\"      Treatment Plan     Treatment Plan Review competed on: January 19, 2023    Staff Members contributing:  Carla Goldstein, [facility], [facility] & MAMADOU Nelson, [facility]                         Received Supervision:  yes    Client: contributed to goals and plan.  Client received copy of plan/revised plan: Yes  Client agrees with plan/revised plan: Yes    Changes to Treatment Plan: No  New Goals added since last review:  No additional goals added at this time.    Goals worked on since last review:  Dimension 1 No reports of gambling.   Dimension 2 Patient reports ability to navigate the healthcare system as needed.   Dimension 3 Patient attended group with topics on: Emotional Meaning of Money and a video: Happiness is all in your mind.   Dimension 4 Patient continues to work on treatment plan goals.   Dimension 5 Patient attended group with topic: Money and Finances.   Dimension 6 Patient attended family group with topic: Progression and effect of compulsive gambling on individuals and partners/families. Patient reports continued attendance in GA meetings.     Strategies effective: yes    Strategies need these changes:   Continue to build supports in the community and work on treatment plan goals.        Depression and Anxiety at Intake:   Patient's PHQ-9 score was 16 out of 27, indicating symptoms related to moderately severe depression.  Patient's CAROLANN-7 score was 13, indicating symptoms related to moderate anxiety.     Intake Dimension Ratings:    Dimension 1  0    Dimension 2  0    Dimension 3  2    " Dimension 4  0    Dimension 5  3    Dimension 6  0      Guide to Risk Ratings for Suicidality:   IDEATION: Active thoughts of suicide? INTENT: Intent to follow on suicide? PLAN: Plan to follow through on suicide? Level of Risk:   IF Yes Yes Yes Patient = High Emergent   IF Yes Yes No Patient = High Urgent/Non-Emergent   IF Yes No No Patient = Moderate Non-Urgent   IF No No   No Patient = Low Risk   The patient's ADDITIONAL RISK FACTORS and lack of PROTECTIVE FACTORS may increase their overall suicide risk ratings.     Patient's/client's current risk rating:  Low Risk    Safety Plan on File  No risk identified    Family Involvement:   patient participated in family group    Data:   client did actively participate    Intervention:   Education      Assessment:   Stages of Change Model  Action    Appears/Sounds:  Engaged      Plan:  Focus on recovery environment  Monitor emotional/physical health      Nirali CEDILLO, CGC

## 2023-01-20 NOTE — PROGRESS NOTES
"Group Therapy Documentation     Was the patient seen in-person or via Telemedicine (if in-person skip to Group Note): Telemedicine    If Telemedicine: The patient's condition can be safely assessed and treated via synchronous audio and visual telemedicine encounter. ? ?      Reason for Telemedicine Visit: Services only offered telehealth    Originating Site (Patient Location): Patient's home    Distant Site (Provider Location): Provider Remote Setting- Home Office    Consent: ?The patient/guardian has verbally consented to: the potential risks and benefits of telemedicine (video visit) versus in person care; bill my insurance or make self-payment for services provided; and responsibility for payment of non-covered services.       Mode of Communication:??Video Conference via Zoom      As the provider I attest to compliance with applicable laws and regulations related to telemedicine.      Counselor verified Patient with 2-point verification: Patient is known to provider      Patient attended a video group session on the following days: ?          GROUP NOTE:  DATE OF SERVICE:  January 19, 2023    START TIME:  5:30pm    END TIME:  7:25pm    Group Length:   115 minutes    FACILITATOR(S):    Nirali CEDILLO [facility]    TOPIC: BEH Group Therapy, Problem Gambling Group     Number of patients attending the group: 8    Group Therapy Type:  Problem Gambling Group    Group Attendance:  Client attended group     Summary of Group / Topics Discussed:   Group started with a quick check in from patients. Also, a recap of Tuesdays group topics. Writer and patients discussed what is Mindfulness and everyone participated in a mindfulness activity. Group continued with discussion on the Emotional Meaning if Money and Money and Finances. Patients took turns discussing their history with money and ways to have a healthy relationship with money. Patients watched a video on \"Happiness is all in your mind\" with discussion following video. Group " ended with a mindfulness task/reading.      Patient's response to the group topic/interactions:    Patient appeared to gain a better understanding of their relationship with money.       Client specific details:     Patient was and active participant in group. Patient discussed how he needs to learn about the value of money. Patient shared now that he is not gambling he is starting to learn this.       Nirali Briceño LADMARY, [facility]

## 2023-01-24 ENCOUNTER — HOSPITAL ENCOUNTER (OUTPATIENT)
Dept: BEHAVIORAL HEALTH | Facility: CLINIC | Age: 43
Discharge: HOME | End: 2023-01-24
Attending: FAMILY MEDICINE
Payer: COMMERCIAL

## 2023-01-24 PROCEDURE — 90853 GROUP PSYCHOTHERAPY: CPT | Mod: GT

## 2023-01-25 ENCOUNTER — HOSPITAL ENCOUNTER (OUTPATIENT)
Dept: BEHAVIORAL HEALTH | Facility: CLINIC | Age: 43
Discharge: HOME | End: 2023-01-25
Attending: FAMILY MEDICINE
Payer: COMMERCIAL

## 2023-01-25 PROCEDURE — 90853 GROUP PSYCHOTHERAPY: CPT | Mod: GT

## 2023-01-25 NOTE — PROGRESS NOTES
Group Therapy Documentation     Was the patient seen in-person or via Telemedicine (if in-person skip to Group Note): Telemedicine    If Telemedicine: The patient's condition can be safely assessed and treated via synchronous audio and visual telemedicine encounter. ? ?      Reason for Telemedicine Visit: Services only offered telehealth    Originating Site (Patient Location): Patient's home    Distant Site (Provider Location): Provider Remote Setting- Home Office    Consent: ?The patient/guardian has verbally consented to: the potential risks and benefits of telemedicine (video visit) versus in person care; bill my insurance or make self-payment for services provided; and responsibility for payment of non-covered services.       Mode of Communication:??Video Conference via Zoom      As the provider I attest to compliance with applicable laws and regulations related to telemedicine.      Counselor verified Patient with 2-point verification: Patient is known to provider      Patient attended a video group session on the following days: ?          GROUP NOTE:  DATE OF SERVICE:  January 24, 2023    START TIME:  5:30pm    END TIME:  7:22pm    Group Length:   112 minutes    FACILITATOR(S):    Nirali CEDILLO [facility] [place] [facility]     TOPIC: BEH Group Therapy, Problem Gambling Group     Number of patients attending the group: 12    Group Therapy Type:  Problem Gambling Group    Group Attendance:  Client attended group     Summary of Group / Topics Discussed:   Group started with each member taking turns with a check in and identifying two feelings. Group watched a video from YouTube: Nathan Elias - Behavior Model. Then reviewing Chapter 19 Beliefs from the No-Dice Book. Discussion was held on Beliefs, If then, Behavior and then Results for Dimension 5. Also, discussion on the Belief Window. Group ended with a mindfulness task/reading on Self Compassion.       Patient's response to the group topic/interactions:    Patient appeared to gain  more understanding with the Behavior Model and how they can use this in daily life.       Client specific details:     Patient shared he continues to attend GA meetings every night. Patient discussed needing to find a balance and only attend GA groups twice a week while attending group sessions.       Nirali CEDILLO, CGC

## 2023-01-26 ENCOUNTER — HOSPITAL ENCOUNTER (OUTPATIENT)
Dept: BEHAVIORAL HEALTH | Facility: CLINIC | Age: 43
Discharge: HOME | End: 2023-01-26
Attending: FAMILY MEDICINE
Payer: COMMERCIAL

## 2023-01-26 PROCEDURE — 90853 GROUP PSYCHOTHERAPY: CPT | Mod: GT

## 2023-01-26 NOTE — PROGRESS NOTES
Family  Group Documentation     Was the patient seen in-person or via Telemedicine (if in-person skip to Group Note): Telemedicine    If Telemedicine: The patient's condition can be safely assessed and treated via synchronous audio and visual telemedicine encounter. ? ?      Reason for Telemedicine Visit: Services only offered telehealth    Originating Site (Patient Location): Patient's home    Distant Site (Provider Location): Provider Remote Setting- Home Office    Consent: ?The patient/guardian has verbally consented to: the potential risks and benefits of telemedicine (video visit) versus in person care; bill my insurance or make self-payment for services provided; and responsibility for payment of non-covered services.       Mode of Communication:??Video Conference via Zoom      As the provider I attest to compliance with applicable laws and regulations related to telemedicine.      Counselor verified Patient with 2-point verification: Patient is known to provider.    Patient attended a video group session on the following days: ?          GROUP NOTE:  DATE OF SERVICE:  January 25, 2023    START TIME:  5:30 PM    END TIME:  7:11 PM    FACILITATOR(S):  MAMADOU Chen, [facility]    TOPIC: BEH Group Therapy, Problem Gambling Group - Family    Number of patients attending the group: 4    0 family members were also in attendance via video conference.    Group Length:   101 minutes    Group Therapy Type:  Problem Gambling Family Group    Group Attendance:  Client attended group.    Summary of Group / Topics Discussed: Group checked in as no family members were in attendance.  Group discussion was held on various recovery related topics, including finding balance within recovery, navigating both internal and external stressors and emotions, self-care in recovery and connection with others, including friends, family, group members and support from others in Gamblers Anonymous as well as various ways of  connecting and vulnerability within connection.  Group ended with a virtue reading on courage.     Patient's response to the group topic/interactions:  PT appeared to gain insight into navigating various components into recovery, including routine, balance and connection with others.      Client specific details: PT actively engaged in group discussion, sharing his connections to his family in recovery.    Carla Goldstein, MAMADOU, CGC

## 2023-01-26 NOTE — PROGRESS NOTES
"Patient:  Joes Antonio Krause                         Date of Assessment: 12/09/2022            Problem Gambling Progress Note and Treatment Plan Review     Attendance  Group/Individual: Phase I      Groups Attended: 01/24/23, 01/25/23, 01/26/23    Support group attended this week: yes    Reporting sobriety:  yes    Client Treatment Goals:   1.\"Stop gambling\"  2.\"Getting rid of depression\"  3.\"Builiding a healthier mind frame\"      Treatment Plan     Treatment Plan Review competed on: January 26, 2023    Staff Members contributing:  Carla Goldstein, [facility], [facility] & MAMADOU Nelson, [facility]                         Received Supervision:  yes    Client: contributed to goals and plan.  Client received copy of plan/revised plan: Yes  Client agrees with plan/revised plan: Yes    Changes to Treatment Plan: No  New Goals added since last review:  No additional goals added at this time.    Goals worked on since last review:  Dimension 1 No reports of gambling.   Dimension 2 No reported change in health.   Dimension 3 Patient attended groups with topics: Behavior Model/Beliefs/Belief Window and Explore the real you/Who are you?   Dimension 4 Patient attends group sessions and adds to discussion.   Dimension 5 Patient shared he is using exercise as on of his coping skills in recovery.   Dimension 6 Patient attended family group with discussion on various recovery related topics. Patient reported attending GA meetings every day and exploring daily structure and balance, in addition to engaging with other and activities supportive of recovery efforts.     Strategies effective: yes    Strategies need these changes:   Continue to build supports in the community.       Depression and Anxiety at Intake:   Patient's PHQ-9 score was 16 out of 27, indicating symptoms related to moderately severe depression.  Patient's CAROLANN-7 score was 13, indicating symptoms related to moderate anxiety.        Intake Dimension Ratings:    Dimension 1  0    " Dimension 2  0    Dimension 3  2    Dimension 4  0    Dimension 5  3    Dimension 6  0      Guide to Risk Ratings for Suicidality:   IDEATION: Active thoughts of suicide? INTENT: Intent to follow on suicide? PLAN: Plan to follow through on suicide? Level of Risk:   IF Yes Yes Yes Patient = High Emergent   IF Yes Yes No Patient = High Urgent/Non-Emergent   IF Yes No No Patient = Moderate Non-Urgent   IF No No   No Patient = Low Risk   The patient's ADDITIONAL RISK FACTORS and lack of PROTECTIVE FACTORS may increase their overall suicide risk ratings.     Patient's/client's current risk rating:  Low Risk  No gambling reported  Safety Plan on File  No risk identified    Family Involvement:   none schedule this week    Data:   offered feedback good insight client did actively participate      Intervention:   Counselor feedback  Education  Group feedback      Assessment:   Stages of Change Model  Action    Appears/Sounds:  Motivated      Plan:  Focus on recovery environment  Monitor emotional/physical health  Continue to work towards treatment plan goals.      Nirali CEDILLO, CGC

## 2023-01-27 NOTE — PROGRESS NOTES
Group Therapy Documentation     Was the patient seen in-person or via Telemedicine (if in-person skip to Group Note): Telemedicine    If Telemedicine: The patient's condition can be safely assessed and treated via synchronous audio and visual telemedicine encounter. ? ?      Reason for Telemedicine Visit: Services only offered telehealth    Originating Site (Patient Location): Patient's home    Distant Site (Provider Location): Provider Remote Setting- Home Office    Consent: ?The patient/guardian has verbally consented to: the potential risks and benefits of telemedicine (video visit) versus in person care; bill my insurance or make self-payment for services provided; and responsibility for payment of non-covered services.       Mode of Communication:??Video Conference via Zoom      As the provider I attest to compliance with applicable laws and regulations related to telemedicine.      Counselor verified Patient with 2-point verification: Patient is known to provider      Patient attended a video group session on the following days: ?          GROUP NOTE:  DATE OF SERVICE:  January 26, 2023    START TIME:  5:30pm    END TIME:  7:07pm    Group Length:   97 Minutes    FACILITATOR(S):    Nirali CEDILLO [facility]     TOPIC: BEH Group Therapy, Problem Gambling Group     Number of patients attending the group: 8    Group Therapy Type:  Problem Gambling Group    Group Attendance:  Client attended group     Summary of Group / Topics Discussed:   Group started with a short check in from some of the group members. The group read and discussed Chapter 21 Explore the Real you and Chapter 22 Who are you? From the No-Dice Book. Topics about self-worth, self-esteem and self-image for Dimension 3. Group ended a little early due to writer s illness.      Patient's response to the group topic/interactions:    Patient appeared to gain a better awareness about self.       Client specific details:     Patient shared many techniques and self  help ideas he has learned about, in the past year with the other group members. Patient also shared about the GA meeting he attends and posted in chat how the other group members could attend the meeting.       Nirali CEDILLO, [facility]

## 2023-01-31 ENCOUNTER — HOSPITAL ENCOUNTER (OUTPATIENT)
Dept: BEHAVIORAL HEALTH | Facility: CLINIC | Age: 43
Discharge: HOME | End: 2023-01-31
Attending: FAMILY MEDICINE
Payer: COMMERCIAL

## 2023-01-31 PROCEDURE — 90853 GROUP PSYCHOTHERAPY: CPT | Mod: GT

## 2023-02-01 ENCOUNTER — HOSPITAL ENCOUNTER (OUTPATIENT)
Dept: BEHAVIORAL HEALTH | Facility: CLINIC | Age: 43
Discharge: HOME | End: 2023-02-01
Attending: FAMILY MEDICINE
Payer: COMMERCIAL

## 2023-02-01 PROCEDURE — 90853 GROUP PSYCHOTHERAPY: CPT | Mod: GT

## 2023-02-01 NOTE — PROGRESS NOTES
Group Therapy Documentation     Was the patient seen in-person or via Telemedicine (if in-person skip to Group Note): Telemedicine    If Telemedicine: The patient's condition can be safely assessed and treated via synchronous audio and visual telemedicine encounter. ? ?      Reason for Telemedicine Visit: Services only offered telehealth    Originating Site (Patient Location): Patient's home    Distant Site (Provider Location): Provider Remote Setting- Home Office    Consent: ?The patient/guardian has verbally consented to: the potential risks and benefits of telemedicine (video visit) versus in person care; bill my insurance or make self-payment for services provided; and responsibility for payment of non-covered services.       Mode of Communication:??Video Conference via Zoom      As the provider I attest to compliance with applicable laws and regulations related to telemedicine.      Counselor verified Patient with 2-point verification: Patient is known to provider      Patient attended a video group session on the following days: ?          GROUP NOTE:  DATE OF SERVICE:  January 31, 2023    START TIME:  5:30pm    END TIME:  7:20pm    Group Length:   110 minutes    FACILITATOR(S):    Nirali CEDILLO [facility]    TOPIC: BEH Group Therapy, Problem Gambling Group     Number of patients attending the group: 9    Group Therapy Type:  Problem Gambling Group    Group Attendance:  Client attended group     Summary of Group / Topics Discussed:   Group started with each patient taking turns for check in and identifying two emotions. Writer went over information on the topic for tomorrows family group. This will be a speaker from [employer], patients and family members encouraged to attend. Patients watched a short video on PAWS - Post Acute Withdrawal Syndrome for Dimension 2 followed by discussion and more information that included description on how the brain is affected and symptoms that may occur during recovery. Group  ended with a mindfulness reading.      Patient's response to the group topic/interactions:    Patient appeared to gain awareness of PAWS.       Client specific details:   Patient shared he had a session with his mental health therapist last Friday. Also that he spent time with friends and family over the weekend. Patient reported he continues to attend GA meetings every night.       Nirali CEDILLO, CGC

## 2023-02-02 ENCOUNTER — HOSPITAL ENCOUNTER (OUTPATIENT)
Dept: BEHAVIORAL HEALTH | Facility: CLINIC | Age: 43
Discharge: HOME | End: 2023-02-02
Attending: FAMILY MEDICINE
Payer: COMMERCIAL

## 2023-02-02 PROCEDURE — 90853 GROUP PSYCHOTHERAPY: CPT | Mod: GT

## 2023-02-02 NOTE — PROGRESS NOTES
"Patient:  Jose Antonio Krause                         Date of Assessment: 12/09/2022            Problem Gambling Progress Note and Treatment Plan Review     Attendance  Group/Individual: Phase I      Groups Attended: 01/31/23, 02/01/23, 02/02/23    Support group attended this week: yes    Reporting sobriety:  yes    Client Treatment Goals:   1.\"Stop gambling\"  2.\"Getting rid of depression\"  3.\"Builiding a healthier mind frame\"      Treatment Plan     Treatment Plan Review competed on: February 2, 2023    Staff Members contributing:  Carla Goldstein, ESVIN, [facility] & MAMADOU Nelson, [facility]                         Received Supervision:  yes    Client: contributed to goals and plan.  Client received copy of plan/revised plan: Yes  Client agrees with plan/revised plan: Yes    Changes to Treatment Plan: No  New Goals added since last review:  No additional goals added at this time.    Goals worked on since last review:  Dimension 1 Patient reports no gambling.   Dimension 2 Patient attended group with topic on PAWS - Post Acute Withdrawal Syndrome.  Dimension 3 Patient attended group with topic on Anger - How to identify levels and how to deal with these strong emotions in the future. Patient reports meeting with mental health therapist last week.   Dimension 4 Patient continued to attend group each week and add to discussion.   Dimension 5 Patient continues to work on treatment plan goals and works each week to find new coping skills.   Dimension 6 Patient attended group with speaker from [employer] on financial literacy education. Patient also reports continued attenance at GA meetings and daily exercise.     Strategies effective: yes    Strategies need these changes:   Continue to build supports in the community.    Depression and Anxiety at Intake:   Patient's PHQ-9 score was 16 out of 27, indicating symptoms related to moderately severe depression.  Patient's CAROLANN-7 score was 13, indicating symptoms related to " moderate anxiety.        Intake Dimension Ratings:    Dimension 1  0    Dimension 2  0    Dimension 3  2    Dimension 4  0    Dimension 5  3    Dimension 6  0      Guide to Risk Ratings for Suicidality:   IDEATION: Active thoughts of suicide? INTENT: Intent to follow on suicide? PLAN: Plan to follow through on suicide? Level of Risk:   IF Yes Yes Yes Patient = High Emergent   IF Yes Yes No Patient = High Urgent/Non-Emergent   IF Yes No No Patient = Moderate Non-Urgent   IF No No   No Patient = Low Risk   The patient's ADDITIONAL RISK FACTORS and lack of PROTECTIVE FACTORS may increase their overall suicide risk ratings.     Patient's/client's current risk rating:  Low Risk    Safety Plan on File  No risk identified    Family Involvement:   PT attends family group independently.    Data:   client did actively participate      Intervention:   Counselor feedback  Education  Group feedback      Assessment:   Stages of Change Model  Action    Appears/Sounds:  Engaged      Plan:  Focus on recovery environment  Monitor emotional/physical health      Nirali CEDILLO, CGC

## 2023-02-02 NOTE — PROGRESS NOTES
Family  Group Documentation     Was the patient seen in-person or via Telemedicine (if in-person skip to Group Note): Telemedicine    If Telemedicine: The patient's condition can be safely assessed and treated via synchronous audio and visual telemedicine encounter. ? ?      Reason for Telemedicine Visit: Services only offered telehealth    Originating Site (Patient Location): Patient's home    Distant Site (Provider Location): Provider Remote Setting- Home Office    Consent: ?The patient/guardian has verbally consented to: the potential risks and benefits of telemedicine (video visit) versus in person care; bill my insurance or make self-payment for services provided; and responsibility for payment of non-covered services.       Mode of Communication:??Video Conference via Zoom      As the provider I attest to compliance with applicable laws and regulations related to telemedicine.      Counselor verified Patient with 2-point verification: Patient is known to provider.    Patient attended a video group session on the following days: ?          GROUP NOTE:  DATE OF SERVICE:  February 1, 2023    START TIME:  5:30 PM    END TIME:  7:22 PM    FACILITATOR(S):  MAMADOU Chen, [facility] [place] [facility]    TOPIC: BEH Group Therapy, Problem Gambling Group - Family    Number of patients attending the group: 4       3 family members were also in attendance via video conference.    Group Length:   112 minutes    Group Therapy Type:  Problem Gambling Family Group    Group Attendance:  Client attended group.    Summary of Group / Topics Discussed: Emily Garrido from [employer] joined group with a presentation on financial resources and education to group members and their families as well as answered any financial questions they had.  Discussion was held following regarding financial communication with others, including partners, spouses, family members, and business partners and understanding money as a trigger as well as  communicating understanding from different perspectives of money and debt accrued while active in addiction as well as communicating financial stressors and reliefs.     Patient's response to the group topic/interactions:  PT's appeared to gain insight into financial literacy and financial resources.      Client specific details: Patient actively listened to presenter and asked questions.  PT actively participated in group discussion following.    Carla Goldstein, MAMADOU, CGC

## 2023-02-07 ENCOUNTER — HOSPITAL ENCOUNTER (OUTPATIENT)
Dept: BEHAVIORAL HEALTH | Facility: CLINIC | Age: 43
Discharge: HOME | End: 2023-02-07
Attending: FAMILY MEDICINE
Payer: COMMERCIAL

## 2023-02-07 PROCEDURE — 90853 GROUP PSYCHOTHERAPY: CPT | Mod: 95

## 2023-02-08 ENCOUNTER — HOSPITAL ENCOUNTER (OUTPATIENT)
Dept: BEHAVIORAL HEALTH | Facility: CLINIC | Age: 43
Discharge: HOME | End: 2023-02-08
Attending: FAMILY MEDICINE
Payer: COMMERCIAL

## 2023-02-08 PROCEDURE — 90832 PSYTX W PT 30 MINUTES: CPT | Mod: GT

## 2023-02-08 PROCEDURE — 90853 GROUP PSYCHOTHERAPY: CPT | Mod: 95

## 2023-02-08 ASSESSMENT — ANXIETY QUESTIONNAIRES
1. FEELING NERVOUS, ANXIOUS, OR ON EDGE: NOT AT ALL
2. NOT BEING ABLE TO STOP OR CONTROL WORRYING: NOT AT ALL

## 2023-02-08 NOTE — PROGRESS NOTES
Group Therapy Documentation     Was the patient seen in-person or via Telemedicine (if in-person skip to Group Note): Telemedicine    If Telemedicine: The patient's condition can be safely assessed and treated via synchronous audio and visual telemedicine encounter. ? ?      Reason for Telemedicine Visit: Services only offered telehealth    Originating Site (Patient Location): Patient's home    Distant Site (Provider Location): Provider Remote Setting- Home Office    Consent: ?The patient/guardian has verbally consented to: the potential risks and benefits of telemedicine (video visit) versus in person care; bill my insurance or make self-payment for services provided; and responsibility for payment of non-covered services.       Mode of Communication:??Video Conference via Zoom      As the provider I attest to compliance with applicable laws and regulations related to telemedicine.      Counselor verified Patient with 2-point verification: Patient is known to provider      Patient attended a video group session on the following days: ?          GROUP NOTE:  DATE OF SERVICE:  February 7, 2023    START TIME:  5:30pm    END TIME:  7:16pm    Group Length:   106 minutes    FACILITATOR(S):    Nirali CEDILLO [facility]     TOPIC: BEH Group Therapy, Problem Gambling Group     Number of patients attending the group: 10    Group Therapy Type:  Problem Gambling Group    Group Attendance:  Client attended group     Summary of Group / Topics Discussed:   Group started with each member participating in check in and identifying two feelings/emotions. Group members shared how they work out and how to have going to the gym and working out become part of your schedule. Group discussion was held on Grief and Loss. Working through chapter 25 in the No-Dice Book and watching a video from [employer]: 7 signs You're not Dealing with your Grief and Loss. Discussion topics were on identifying grief and loss and learning how to deal with these strong  emotions in a healthy way. Group ended with a positive reading.      Patient's response to the group topic/interactions:   Patient appeared to gain a better awareness on Grief and Loss and ways to deal with these emotions in a healthy way.       Client specific details:    Patient shared techniques to other group members on how to start going to the gym and have it become part of their schedule. Patient shared being with family over the weekend. Patient reports continued attendance at GA meetings and working the steps.       Nirali CEDILLO, CGC

## 2023-02-09 ENCOUNTER — HOSPITAL ENCOUNTER (OUTPATIENT)
Dept: BEHAVIORAL HEALTH | Facility: CLINIC | Age: 43
Discharge: HOME | End: 2023-02-09
Attending: FAMILY MEDICINE
Payer: COMMERCIAL

## 2023-02-09 PROCEDURE — 90853 GROUP PSYCHOTHERAPY: CPT | Mod: 95

## 2023-02-09 NOTE — PROGRESS NOTES
Individual counseling session:    Telemedicine Visit: The patient's condition can be safely assessed and treated via synchronous audio and visual telemedicine encounter.      Reason for Telemedicine Visit: Services only offered telehealth    Originating Site (Patient Location): Patient's home    Distant Site (Provider Location): Provider Remote Setting- Home Office    Consent:  The patient/guardian has verbally consented to: the potential risks and benefits of telemedicine (video visit) versus in person care; bill my insurance or make self-payment for services provided; and responsibility for payment of non-covered services.     Mode of Communication:  [employer] as PT and writer met via video following group therapy session.    As the provider I attest to compliance with applicable laws and regulations related to telemedicine.    Counselor verified Patient with 2-point verification: Patient is known to provider.    Video visit start time: 7:16 PM  Video visit end time: 7:45 PM    Length of session: 29 minutes    D: Writer met with PT for scheduled individual counseling session following group therapy session.  PT has completed Phase I of programming and transitioned to Phase II, PT updated writer on recovery supports and strategies and denied any urges to walker and denied any gambling behaviors.  PT reports he continues to meet with [facility] mental health therapist.  PT answered and completed CAROLANN 2 and PHQ-2 as well as the Promise 4 questions.  I: Counselor facilitated 1:1 session.  A: PT presents with continued motivation for recovery efforts.  P: PT to attend Phase II of programming, Tuesday/Thursday primary groups and continue to work towards treatment plan goals.    Carla Goldstein, [facility], [facility]

## 2023-02-09 NOTE — PROGRESS NOTES
Family Group Documentation     Was the patient seen in-person or via Telemedicine (if in-person skip to Group Note): Telemedicine    If Telemedicine: The patient's condition can be safely assessed and treated via synchronous audio and visual telemedicine encounter. ? ?      Reason for Telemedicine Visit: Services only offered telehealth    Originating Site (Patient Location): Patient's home    Distant Site (Provider Location): Provider Remote Setting- Home Office    Consent: ?The patient/guardian has verbally consented to: the potential risks and benefits of telemedicine (video visit) versus in person care; bill my insurance or make self-payment for services provided; and responsibility for payment of non-covered services.       Mode of Communication:??Video Conference via Zoom      As the provider I attest to compliance with applicable laws and regulations related to telemedicine.      Counselor verified Patient with 2-point verification: Patient is known to provider.    Patient attended a video group session on the following days: ?          GROUP NOTE:  DATE OF SERVICE:  February 8, 2023    START TIME:  5:30 PM    END TIME:  7:16 PM    FACILITATOR(S):  MAMADOU Chen, [facility]    TOPIC: BEH Group Therapy, Problem Gambling Group - Family    Number of patients attending the group: 6       2 family members were also in attendance via video conference.    Group Length:   106 minutes    Group Therapy Type:  Problem Gambling Family Group    Group Attendance:  Client attended group     Summary of Group / Topics Discussed: Focus of family group on communication, including a review of types:  Passive, Passive Aggressive, Aggressive and Assertive with focus on ways of utilizing assertive communication.  Discussion was held on effective communication as well as barriers including listening from Mark George, Ph.D. as well as introducing The Five Love Languages by Larry Guadarrama.  Group participated in listening  activity.     Patient's response to the group topic/interactions:  PT appeared to gain insight into effective communication strategies.      Client specific details:   PT actively shared in group discussion on improved communication in recovery.    PT remained on video as previous scheduled with writer for individual session.  Please see individual note.    Carla Goldstein, MAMADOU, CGC

## 2023-02-09 NOTE — PROGRESS NOTES
"Patient:  Jose Antonio Krause                         Date of Assessment: 12/09/2022            Problem Gambling Progress Note and Treatment Plan Review     Attendance  Group/Individual: Patient has completed Phase I of programming and has transitioned to Phase II of programming.    Groups Attended: 02/07/23, 02/08/23, 02/09/23.  PT attended individual counseling session on 02/08/23    Support group attended this week: yes    Reporting sobriety:  yes    Client Treatment Goals:   1.\"Stop gambling\"  2.\"Getting rid of depression\"  3.\"Builiding a healthier mind frame\"      Treatment Plan     Treatment Plan Review competed on: February 9, 2023    Staff Members contributing:  MAMADOU Chen, [facility] & MAMADOU Nelson, [facility]                         Received Supervision:  yes    Client: contributed to goals and plan.  Client received copy of plan/revised plan: Yes  Client agrees with plan/revised plan: Yes    Changes to Treatment Plan: No  New Goals added since last review:  No additional goals added at this time.    Goals worked on since last review:  Dimension 1 Patient reports no gambling.   Dimension 2 No reported change in health. Patient shared with group members techniques for working out.   Dimension 3 Patient attended group with topics: Grief and Loss, Communication and The five love languages. Patient shared with group he continues to work on steps in GA. Sharing he will be working on a goodbye letter to gambling. Patient also reported meeting with mental health provider.   Dimension 4 Patient has completed Phase I and now will only attend group on Tuesday and Thursday nights for Phase II.    Dimension 5 Patient reports being more tired than usual on days off from work during the week, as these were days he would have [place] the [place] to [place].   Dimension 6 Patient reports attending GA meetings and working out.  PT attended family group individually.    Strategies effective: yes    Strategies need these " changes:   Continue to build supports in the community.    Depression and Anxiety at Intake:   Patient's PHQ-9 score was 16 out of 27, indicating symptoms related to moderately severe depression.  Patient's CAROLANN-7 score was 13, indicating symptoms related to moderate anxiety.        Intake Dimension Ratings:    Dimension 1  0    Dimension 2  0    Dimension 3  2    Dimension 4  0    Dimension 5  3    Dimension 6  0      Guide to Risk Ratings for Suicidality:   IDEATION: Active thoughts of suicide? INTENT: Intent to follow on suicide? PLAN: Plan to follow through on suicide? Level of Risk:   IF Yes Yes Yes Patient = High Emergent   IF Yes Yes No Patient = High Urgent/Non-Emergent   IF Yes No No Patient = Moderate Non-Urgent   IF No No   No Patient = Low Risk   The patient's ADDITIONAL RISK FACTORS and lack of PROTECTIVE FACTORS may increase their overall suicide risk ratings.     Patient's/client's current risk rating:  Low Risk    Safety Plan on File  No risk identified    Family Involvement:   PT attended family group individually.    Data:   client did actively participate    Intervention:   Counselor feedback  Education  Group feedback      Assessment:   Stages of Change Model  Action    Appears/Sounds:  Motivated  Engaged      Plan:  Focus on recovery environment  Monitor emotional/physical health    Nirali CEDILLO, CGC

## 2023-02-10 NOTE — PROGRESS NOTES
Group Therapy Documentation     Was the patient seen in-person or via Telemedicine (if in-person skip to Group Note): Telemedicine    If Telemedicine: The patient's condition can be safely assessed and treated via synchronous audio and visual telemedicine encounter. ? ?      Reason for Telemedicine Visit: Services only offered telehealth    Originating Site (Patient Location): Patient's home    Distant Site (Provider Location): Provider Remote Setting- Home Office    Consent: ?The patient/guardian has verbally consented to: the potential risks and benefits of telemedicine (video visit) versus in person care; bill my insurance or make self-payment for services provided; and responsibility for payment of non-covered services.       Mode of Communication:??Video Conference via Zoom      As the provider I attest to compliance with applicable laws and regulations related to telemedicine.      Counselor verified Patient with 2-point verification: Patient is known to provider      Patient attended a video group session on the following days: ?          GROUP NOTE:  DATE OF SERVICE:  February 9, 2023    START TIME:  5:30pm    END TIME:  7:16pm    Group Length:   104 minutes    FACILITATOR(S):    Nirali CEDILLO CGC     TOPIC: BEH Group Therapy, Problem Gambling Group     Number of patients attending the group: 7    Group Therapy Type:  Problem Gambling Group    Group Attendance:  Client attended group     Summary of Group / Topics Discussed:   Group started with an introduction to the Gamblers Anonymous speaker. The Gamblers Anonymous speaker discussed their history with gambling and recovery. Patients asked questions about GA and about meetings. After the speaker left the group, group members took turns with check in and continued discussion about GA.      Patient's response to the group topic/interactions:    Patient appeared to gain more knowledge about Gamblers Anonymous.        Client specific details:    After speaker  left the group, patient gave group members more information about working the GA steps. Patient offered to email or text group members GA paperwork he has in PDF files. Patient shared with group how tired he was today and that because of this he will be attending less GA meetings per week.       Nirali Briceño [facility], [facility]

## 2023-02-12 ENCOUNTER — HEALTH MAINTENANCE LETTER (OUTPATIENT)
Age: 43
End: 2023-02-12

## 2023-02-14 ENCOUNTER — HOSPITAL ENCOUNTER (OUTPATIENT)
Dept: BEHAVIORAL HEALTH | Facility: CLINIC | Age: 43
Discharge: HOME | End: 2023-02-14
Attending: FAMILY MEDICINE
Payer: COMMERCIAL

## 2023-02-14 PROCEDURE — 90853 GROUP PSYCHOTHERAPY: CPT | Mod: GT

## 2023-02-15 NOTE — PROGRESS NOTES
Group Therapy Documentation     Was the patient seen in-person or via Telemedicine (if in-person skip to Group Note): Telemedicine    If Telemedicine: The patient's condition can be safely assessed and treated via synchronous audio and visual telemedicine encounter. ? ?      Reason for Telemedicine Visit: Services only offered telehealth    Originating Site (Patient Location): Patient's home    Distant Site (Provider Location): Provider Remote Setting- Home Office    Consent: ?The patient/guardian has verbally consented to: the potential risks and benefits of telemedicine (video visit) versus in person care; bill my insurance or make self-payment for services provided; and responsibility for payment of non-covered services.       Mode of Communication:??Video Conference via Zoom      As the provider I attest to compliance with applicable laws and regulations related to telemedicine.      Counselor verified Patient with 2-point verification: Patient is known to provider      Patient attended a video group session on the following days: ?          GROUP NOTE:  DATE OF SERVICE:  February 14, 2023    START TIME:  5:30pm    END TIME:  7:19pm    Group Length:   109 minutes    FACILITATOR(S):    Nirali CEDILLO [facility]     TOPIC: BEH Group Therapy, Problem Gambling Group     Number of patients attending the group: 8    Group Therapy Type:  Problem Gambling Group    Group Attendance:  Client attended group     Summary of Group / Topics Discussed:   Group started with each patient taking turns with check in and identifying two feelings. Discussion was held on urges and triggers associate with the super bowl. The group watched the video: How I Tricked my Brain to Like Doing Hard Things (Dopamine Detox). Discussion after video included topics on setting limits with ourselves, the same as teaching children. How to limit high dopamine activities and add lower dopamine active to daily life. Patients discussed questions from the  "worksheet \"Get Motivated\", all for dimension 4. Patients watched short video on SMART phrases. Patients were assigned to each come up with one SMART phrase for their next group session. Group ended with a Positive Thinking reading.      Patient's response to the group topic/interactions:   Patient appeared to gain awareness how dopamine affects the brain and on motivation.       Client specific details:  Patient shared he spent time with family this past weekend and attended [employer]. Patient shared with group he attended a \"[place]\" GA group on Sunday. This was a GA meeting for [place] Sunday and went from 7am to 10pm. Hosted by a GA group in [place] and had different hosts from other GA groups every two hours. Patient reported still working on forgiveness in GA steps. Patient mentioned to group he does not believe in the \"reward system\" to get people to get things done. Patient shared his definition of motivation is recovery.       Nirali Briceño LADC, [facility]   "

## 2023-02-16 ENCOUNTER — HOSPITAL ENCOUNTER (OUTPATIENT)
Dept: BEHAVIORAL HEALTH | Facility: CLINIC | Age: 43
Discharge: HOME | End: 2023-02-16
Attending: FAMILY MEDICINE
Payer: COMMERCIAL

## 2023-02-16 PROCEDURE — 90853 GROUP PSYCHOTHERAPY: CPT | Mod: GT

## 2023-02-16 NOTE — PROGRESS NOTES
"Patient:  Jose Antonio Krause                         Date of Assessment: 12/09/22            Problem Gambling Progress Note and Treatment Plan Review     Attendance  Group/Individual: Phase II       Groups Attended: 02/14/23, 02/16/23    Support group attended this week: yes    Reporting sobriety:  yes    Client Treatment Goals:   1.\"Stop gambling\"  2.\"Getting rid of depression\"  3.\"Builiding a healthier mind frame\"      Treatment Plan     Treatment Plan Review competed on: February 16, 2023    Staff Members contributing:  Carla Goldstein, [facility], [facility] & Nirali Briceño, MAMADOU, [facility]                         Received Supervision:  yes    Client: contributed to goals and plan.  Client received copy of plan/revised plan: Yes  Client agrees with plan/revised plan: Yes    Changes to Treatment Plan: No  New Goals added since last review:  No additional goals added at this time.    Goals worked on since last review:  Dimension 1 Patient reports no gambling.   Dimension 2 No reported changes in health.   Dimension 3 Patient reports continuing to work on GA steps and right now working on forgiveness.   Dimension 4 Dimension 4 - Patient attended group with video and discussion on How I Tricked my Brain to Like Doing Hard Things (Dopamine Detox). Also went over questions and discussion on Worksheet \"Get Motivated\". Patient attended group with topic S.M.A.R.T Goals watching a video, presenting personal goal and discussion.   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   Dimension 6 Patient shared he attended marathon GA meeting on [facility] Sunday. Patient shared he is spending more time with family on weekends and attending [employer].     Strategies effective: yes    Strategies need these changes:   Continue to build supports in the community.    Depression and Anxiety at Intake:   Patient's PHQ-9 score was 16 out of " 27, indicating symptoms related to moderately severe depression.  Patient's CAROLANN-7 score was 13, indicating symptoms related to moderate anxiety.        Intake Dimension Ratings:    Dimension 1  0    Dimension 2  0    Dimension 3  2    Dimension 4  0    Dimension 5  3    Dimension 6  0      Guide to Risk Ratings for Suicidality:   IDEATION: Active thoughts of suicide? INTENT: Intent to follow on suicide? PLAN: Plan to follow through on suicide? Level of Risk:   IF Yes Yes Yes Patient = High Emergent   IF Yes Yes No Patient = High Urgent/Non-Emergent   IF Yes No No Patient = Moderate Non-Urgent   IF No No   No Patient = Low Risk   The patient's ADDITIONAL RISK FACTORS and lack of PROTECTIVE FACTORS may increase their overall suicide risk ratings.     Patient's/client's current risk rating:  Low Risk    Safety Plan on File  No risk identified    Family Involvement:   none schedule this week    Data:   good insight client did actively participate      Intervention:   Counselor feedback  Education  Group feedback      Assessment:   Stages of Change Model  Action    Appears/Sounds:  Engaged      Plan:  Focus on recovery environment  Monitor emotional/physical health      Nirali CEDILLO, CGC

## 2023-02-17 NOTE — PROGRESS NOTES
Group Therapy Documentation     Was the patient seen in-person or via Telemedicine (if in-person skip to Group Note): Telemedicine    If Telemedicine: The patient's condition can be safely assessed and treated via synchronous audio and visual telemedicine encounter. ? ?      Reason for Telemedicine Visit: Services only offered telehealth    Originating Site (Patient Location): Patient's home    Distant Site (Provider Location): Provider Remote Setting- Home Office    Consent: ?The patient/guardian has verbally consented to: the potential risks and benefits of telemedicine (video visit) versus in person care; bill my insurance or make self-payment for services provided; and responsibility for payment of non-covered services.       Mode of Communication:??Video Conference via Zoom      As the provider I attest to compliance with applicable laws and regulations related to telemedicine.      Counselor verified Patient with 2-point verification: Patient is known to provider      Patient attended a video group session on the following days: ?          GROUP NOTE:  DATE OF SERVICE:  February 16, 2023    START TIME:  5:30pm    END TIME:  7:26pm    Group Length:   116 Minutes    FACILITATOR(S):    Nirali [place]EDILLO [facility] [place]     TOPIC: BEH Group Therapy, Problem Gambling Group     Number of patients attending the group: 6    Group Therapy Type:  Problem Gambling Group    Group Attendance:  Client attended group     Summary of Group / Topics Discussed: Group started with each member checking in and identifying two feelings. Writer shared two articles to group: Snowball vs. Avalanche Debt Paydown and Taxes and Betting. Discussion was held on ways to pay down debt and how to complete taxes for a gambler. Group watched a video on what are SMART Goals and how to create them, for dimension 4. Group completed A-Z Sober activity and group ended with a positive thought reading.      Goal Setting   This topic will give a general overview of  "effective goal setting strategies using S.M.A.R.T. goals (Specific, measurable, attainable, relative, and timely).    ?   Objective(s):    1. Patients will identify the characteristics of S.M.A.R.T. Goals   2.   Patients will identify one thing they are doing well, and one thing they need to work on, regarding life categories discussed.   3.   Patients will be able to list one S.M.A.R.T. goal.   ?   ?   Structure (modalities, homework, worksheets, etc):    1. Provide psychoeducation on goal setting using S.M.A.R.T phrase   2. Work together to complete worksheet on setting goals.   ?   Expected therapeutic outcome(s):    Patient will:   1. Identify areas that need improvement and establish a S.M.A.R.T goal for each improvement needed.    2. Learn how to set more effective goals.    ?   Therapeutic outcome(s) measured by:    Completion of goal setting worksheet and ability to identify goals that are specific, measurable, attainable relevant, and timely.?   ?      Attestation: ??Timmy Crocker MD -?Medical Director -?Provides oversight and supervision of care.      Client specific details:  Patient shared with group patient will be starting a new job in March. 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 Patient shared this is stressful with \"all the paperwork that needs to be done and contacting multiple people\". Patient created SMART Goals of being vegan five days a week. Patient shared he has done this before and has helped him feel better. Patient will implement this next week.      Plan: Counselor will follow up with client to see if he has followed thru with his identified goal.        Nirali CEDILLO, [facility] [place]       Nirali CEDILLO, [facility]   "

## 2023-02-21 ENCOUNTER — HOSPITAL ENCOUNTER (OUTPATIENT)
Dept: BEHAVIORAL HEALTH | Facility: CLINIC | Age: 43
Discharge: HOME | End: 2023-02-21
Attending: FAMILY MEDICINE
Payer: COMMERCIAL

## 2023-02-21 PROCEDURE — 90853 GROUP PSYCHOTHERAPY: CPT | Mod: GT

## 2023-02-22 NOTE — PROGRESS NOTES
Group Therapy Documentation     Was the patient seen in-person or via Telemedicine (if in-person skip to Group Note): Telemedicine    If Telemedicine: The patient's condition can be safely assessed and treated via synchronous audio and visual telemedicine encounter. ? ?      Reason for Telemedicine Visit: Services only offered telehealth    Originating Site (Patient Location): Patient's home    Distant Site (Provider Location): Provider Remote Setting- Home Office    Consent: ?The patient/guardian has verbally consented to: the potential risks and benefits of telemedicine (video visit) versus in person care; bill my insurance or make self-payment for services provided; and responsibility for payment of non-covered services.       Mode of Communication:??Video Conference via Zoom      As the provider I attest to compliance with applicable laws and regulations related to telemedicine.      Counselor verified Patient with 2-point verification: Patient is known to provider      Patient attended a video group session on the following days: ?          GROUP NOTE:  DATE OF SERVICE:  February 21, 2023    START TIME:  5:30pm    END TIME:  7:19pm    Group Length:   109 minutes    FACILITATOR(S):    Nirali CEDILLO [facility] [place]     TOPIC: BEH Group Therapy, Problem Gambling Group     Number of patients attending the group: 9    Group Therapy Type:  Problem Gambling Group    Group Attendance:  Client attended group     Summary of Group / Topics Discussed:   Group started with each patient taking turns for check in and identifying two feelings. Group revisited topics from last Thursdays group on S.M.A.R.T goals. Patients that had no attended group on Thursday shared their goals. Writer shared more information on one sober activity from last Thursday - Geocaching. Group watched three short videos on what is Geocaching and how to do it. Writer shared different tricks to find caches and how this can be a group activity with family, friends  "and Geocaching groups in the area for dimension 6. Some patients shared stories they have heard about this activity. Group members who have attended GA shared what character defects are and how step 6 is worked. Group members watched a short video on: The Science of Character. From the [employer] and writer went over information on the yeppt website. Group discussion was held on character strengths and how to find them. Information was also shared with group on the book Strength Finders 2.0 and worksheet on Strengths and Qualities for Dimension 4. Email to patients at end of group with worksheet and we will discuss more at Thursday s group. Group ended with a Positive Thoughts reading.      Patient's response to the group topic/interactions:    Patient appeared to gain more understanding of character strengths.       Client specific details:    Patient shared with group he spent time helping family over the weekend. Identifying always needing something to do as he feels \"depressed\" when he has open unscheduled time. Patient shared his realization on working through resentment and anger. Patient describing how he is starting to look at what was \"his part\" in these situations. Patient shared he continues to work out and attend GA meetings and having sessions with his mental health therapist. Patient shared with group he has already started working on his SMART goal of eating vegan most of the week. Patient shared with group his experience with working on step 6 in GA with character defects.       Nirali CEDILLO, [facility] [place]   "

## 2023-02-23 ENCOUNTER — HOSPITAL ENCOUNTER (OUTPATIENT)
Dept: BEHAVIORAL HEALTH | Facility: CLINIC | Age: 43
Discharge: HOME | End: 2023-02-23
Attending: FAMILY MEDICINE
Payer: COMMERCIAL

## 2023-02-23 PROCEDURE — 90853 GROUP PSYCHOTHERAPY: CPT | Mod: 95

## 2023-02-23 NOTE — PROGRESS NOTES
"Patient:  Jose Antonio Krause                         Date of Assessment: 12/09/2022            Problem Gambling Progress Note and Treatment Plan Review     Attendance  Group/Individual: Phase II      Groups Attended: 02/21/2023, 02/23/2023    Support group attended this week: yes    Reporting sobriety:  yes    Client Treatment Goals:   1.\"Stop gambling\"  2.\"Getting rid of depression\"  3.\"Builiding a healthier mind frame\"      Treatment Plan     Treatment Plan Review competed on: February 23, 2023    Staff Members contributing:  Carla Goldstein, [facility], [facility] & MAMADOU Nelson, [facility] [place]                         Received Supervision:  yes    Client: contributed to goals and plan.  Client received copy of plan/revised plan: Yes  Client agrees with plan/revised plan: Yes    Changes to Treatment Plan: No  New Goals added since last review:  No additional goals added at this time.    Goals worked on since last review:  Dimension 1 Patient reports no gambling.   Dimension 2 No reported changes in health.   Dimension 3 Patient continues to work on emotion regulation.   Dimension 4 Patient attended group with topic on Character Strengths. Watching a short video and adding to discussion. Patient attended group with worksheet Strength and Qualities, followed by discussion.  Dimension 5  Patient identified needing to fill unscheduled down time. Patient attended group with video and discussion on:  The Addiction Machine  .   Dimension 6 Patient attended group with discussion and short videos on Geocaching. Patient reports continued working out and attendance in GA meetings. Patient shared he is working on SMART goal this week. Patient shared with group more information on Step 6 with GA.     Strategies effective: yes    Strategies need these changes:   Continue to build supports in the community.    Depression and Anxiety at Intake:   Patient's PHQ-9 score was 16 out of 27, indicating symptoms related to moderately severe depression. "  Patient's CAROLANN-7 score was 13, indicating symptoms related to moderate anxiety.     Intake Dimension Ratings:    Dimension 1  0    Dimension 2  0    Dimension 3  2    Dimension 4  0    Dimension 5  3    Dimension 6  0      Guide to Risk Ratings for Suicidality:   IDEATION: Active thoughts of suicide? INTENT: Intent to follow on suicide? PLAN: Plan to follow through on suicide? Level of Risk:   IF Yes Yes Yes Patient = High Emergent   IF Yes Yes No Patient = High Urgent/Non-Emergent   IF Yes No No Patient = Moderate Non-Urgent   IF No No   No Patient = Low Risk   The patient's ADDITIONAL RISK FACTORS and lack of PROTECTIVE FACTORS may increase their overall suicide risk ratings.     Patient's/client's current risk rating:  Low Risk    Safety Plan on File  No risk identified    Family Involvement:   none schedule this week    Data:   client did actively participate      Intervention:   Counselor feedback  Education  Group feedback      Assessment:   Stages of Change Model  Action    Appears/Sounds:  Motivated  Engaged      Plan:  Focus on recovery environment  Monitor emotional/physical health      Nirali CEDILLO, CGC

## 2023-02-24 NOTE — PROGRESS NOTES
"Group Therapy Documentation     Was the patient seen in-person or via Telemedicine (if in-person skip to Group Note): Telemedicine    If Telemedicine: The patient's condition can be safely assessed and treated via synchronous audio and visual telemedicine encounter. ? ?      Reason for Telemedicine Visit: Services only offered telehealth    Originating Site (Patient Location): Patient's home    Distant Site (Provider Location): Provider Remote Setting- Home Office    Consent: ?The patient/guardian has verbally consented to: the potential risks and benefits of telemedicine (video visit) versus in person care; bill my insurance or make self-payment for services provided; and responsibility for payment of non-covered services.       Mode of Communication:??Video Conference via Zoom      As the provider I attest to compliance with applicable laws and regulations related to telemedicine.      Counselor verified Patient with 2-point verification: Patient is known to provider      Patient attended a video group session on the following days: ?          GROUP NOTE:  DATE OF SERVICE:  February 23, 2023    START TIME:  5:30pm    END TIME:  7:22pm    Group Length:   112 minutes    FACILITATOR(S):    Nirali CEDILLO [facility]    TOPIC: BEH Group Therapy, Problem Gambling Group     Number of patients attending the group: 6    Group Therapy Type:  Problem Gambling Group    Group Attendance:  Client attended group     Summary of Group / Topics Discussed:   Group started with each patient taking turns for check in and identifying two feelings. Follow up discussion was held on topics from Tuesday group: Character Strengths and other team building personality tests. Everyone in group completed the worksheet My Strengths and Qualities. Each group member shared their answers and the group discussed why this assignment was difficult. The group started watching the video \"The Addiction Machine\" and will continue with it next Tuesday in group. " "Some of the discussion following the first part of the video were on: how many people are involved in the creation of a slot machine. The music involved, the math involved and the evolution of slot machines. Group ended with a reading from Positive Thoughts.      Patient's response to the group topic/interactions:    Patient appeared able to assess their own personal strengths. Also gaining knowledge in what it takes to create a slot machine.        Client specific details:    Patient shared he attended GA meeting on Tuesday night. Patient also mentioned he had a session with his mental health therapist yesterday. 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 Patient private messaged writer during video saying he would step away until video was over and other group member will let him know when to come back to group. At the end of the video patient appeared back on screen and shared with group he was triggered by video because this is the first time he has \"heard\" a slot machine since he had stopped gambling. Patient described increased heart rate. Patient described feeling better once the video was over.     Nirali CEDILLO, [facility] [place]   "

## 2023-02-28 ENCOUNTER — HOSPITAL ENCOUNTER (OUTPATIENT)
Dept: BEHAVIORAL HEALTH | Facility: CLINIC | Age: 43
Discharge: HOME | End: 2023-02-28
Attending: FAMILY MEDICINE
Payer: COMMERCIAL

## 2023-02-28 PROCEDURE — 90853 GROUP PSYCHOTHERAPY: CPT | Mod: GT

## 2023-03-01 NOTE — PROGRESS NOTES
Group Therapy Documentation     Was the patient seen in-person or via Telemedicine (if in-person skip to Group Note): Telemedicine    If Telemedicine: The patient's condition can be safely assessed and treated via synchronous audio and visual telemedicine encounter. ? ?      Reason for Telemedicine Visit: Services only offered telehealth    Originating Site (Patient Location): Patient's home    Distant Site (Provider Location): Provider Remote Setting- Home Office    Consent: ?The patient/guardian has verbally consented to: the potential risks and benefits of telemedicine (video visit) versus in person care; bill my insurance or make self-payment for services provided; and responsibility for payment of non-covered services.       Mode of Communication:??Video Conference via Zoom      As the provider I attest to compliance with applicable laws and regulations related to telemedicine.      Counselor verified Patient with 2-point verification: Patient is known to provider      Patient attended a video group session on the following days: ?          GROUP NOTE:  DATE OF SERVICE:  February 28, 2023    START TIME:  5:30pm    END TIME:  7:22pm     Group Length:   112 minutes    FACILITATOR(S):    Nirali CEDILLO [facility] [place]     TOPIC: BEH Group Therapy, Problem Gambling Group     Number of patients attending the group: 9    Group Therapy Type:  Problem Gambling Group    Group Attendance:  Client attended group     Summary of Group / Topics Discussed:   Group started with each patient taking turns for check in and identifying two feelings. Patients that were not in attendance for the Strengths and Qualities worksheet in group, sharing their answers. Some group members discussed updates on their SMART goals. Writer shared information on supportive help for partners and spouses while in recovery. Information attained from [facility] [employer]. Patients watched a short video: The 5 Love Languages Explained. Writer  then went over information on website [email or web address]. Patients were shown how to take the quiz on their love language and on Apology Language, Anger and Appreciated at work, for dimension 3. One patient took quiz facilitated by writer during group. Discussion was held on the usefulness of these quizzes and how they might help in relationships. Group ended with a few jokes from patients.      Patient's response to the group topic/interactions:    Patient appeared to gain knowledge in supportive help for partners/spouses also in the 5 Love Languages.       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 Patient shared he spent time with family this past weekend. 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 Patient shared he continues to work on self care by working out and listening to self help podcasts.       Nirali CEDILLO, CGC

## 2023-03-02 ENCOUNTER — HOSPITAL ENCOUNTER (OUTPATIENT)
Dept: BEHAVIORAL HEALTH | Facility: CLINIC | Age: 43
Discharge: HOME | End: 2023-03-02
Attending: FAMILY MEDICINE
Payer: COMMERCIAL

## 2023-03-02 PROCEDURE — 90853 GROUP PSYCHOTHERAPY: CPT | Mod: GT

## 2023-03-02 NOTE — PROGRESS NOTES
"Patient:  Jose Antonio Krause                         Date of Assessment: 12/09/2022            Problem Gambling Progress Note and Treatment Plan Review     Attendance  Group/Individual: Phase II      Groups Attended: 02/28/23, 03/02/23    Support group attended this week: yes    Reporting sobriety:  yes    Client Treatment Goals:   1.\"Stop gambling\"  2.\"Getting rid of depression\"  3.\"Builiding a healthier mind frame\"      Treatment Plan     Treatment Plan Review competed on: March 2, 2023    Staff Members contributing:  Carla Goldstein, [facility], [facility] & MAMADOU Nelson, [facility]                         Received Supervision:  yes    Client: contributed to goals and plan.  Client received copy of plan/revised plan: Yes  Client agrees with plan/revised plan: Yes    Changes to Treatment Plan: No  New Goals added since last review:  No additional goals added at this time.    Goals worked on since last review:  Dimension 1 Patient reports no gambling.   Dimension 2 No reported changes in health.   Dimension 3 Patient attended group with video and discussion on: The 5 Love Languages.   Dimension 4 Patient continues to attend group sessions weekly. Patient completed worksheet on Busyness and added to discussion.   Dimension 5 Patient reports listening to self help and addiction podcast weekly.   Dimension 6 Patient shared he continues to attend GA meetings and is not serving during groups by being a . 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     Strategies effective: yes    Strategies need these changes:   Continue to build supports in the community.    Depression and Anxiety at Intake:   Patient's PHQ-9 score was 16 out of 27, indicating symptoms related to moderately severe depression.  Patient's CAROLANN-7 score was 13, indicating symptoms related to moderate anxiety.        Intake Dimension Ratings:    Dimension 1  0    Dimension 2  0    Dimension 3  2    Dimension 4  " 0    Dimension 5  3    Dimension 6  0      Guide to Risk Ratings for Suicidality:   IDEATION: Active thoughts of suicide? INTENT: Intent to follow on suicide? PLAN: Plan to follow through on suicide? Level of Risk:   IF Yes Yes Yes Patient = High Emergent   IF Yes Yes No Patient = High Urgent/Non-Emergent   IF Yes No No Patient = Moderate Non-Urgent   IF No No   No Patient = Low Risk   The patient's ADDITIONAL RISK FACTORS and lack of PROTECTIVE FACTORS may increase their overall suicide risk ratings.     Patient's/client's current risk rating:  Low Risk    Safety Plan on File  No risk identified    Family Involvement:   none schedule this week    Data:   client did actively participate      Intervention:   Counselor feedback  Education  Group feedback      Assessment:   Stages of Change Model  Action    Appears/Sounds:  Motivated  Engaged      Plan:  Focus on recovery environment  Monitor emotional/physical health      Nirali CEDILLO, CGC

## 2023-03-03 NOTE — PROGRESS NOTES
Group Therapy Documentation     Was the patient seen in-person or via Telemedicine (if in-person skip to Group Note): Telemedicine    If Telemedicine: The patient's condition can be safely assessed and treated via synchronous audio and visual telemedicine encounter. ? ?      Reason for Telemedicine Visit: Patient has requested telehealth visit    Originating Site (Patient Location): Patient's home    Distant Site (Provider Location): Provider Remote Setting- Home Office    Consent: ?The patient/guardian has verbally consented to: the potential risks and benefits of telemedicine (video visit) versus in person care; bill my insurance or make self-payment for services provided; and responsibility for payment of non-covered services.       Mode of Communication:??Video Conference via Zoom      As the provider I attest to compliance with applicable laws and regulations related to telemedicine.      Counselor verified Patient with 2-point verification: Patient is known to provider      Patient attended a video group session on the following days: ?          GROUP NOTE:  DATE OF SERVICE:  March 2, 2023    START TIME:  5:30pm    END TIME:  7:20pm    Group Length:   110 Minutes    FACILITATOR(S):    Nirali CEDILLO CGC     TOPIC: BEH Group Therapy, Problem Gambling Group     Number of patients attending the group: 5    Group Therapy Type:  Problem Gambling Group    Group Attendance:  Client attended group     Summary of Group / Topics Discussed:   Group started with each patient taking turns for check in and identifying two feelings. Patient shared their answers to worksheet on Busyness, working on dimension 4. Discussing how much time they have dedicated each week to work, scheduled activities and free time. Group ended with a Positive Thoughts reading.      Patient's response to the group topic/interactions:    Patient appeared to gain a better awareness on where and how much time they spend each week.       Client specific  "details:    Patient shared he has now started his new employment. Patient shared there is a lot of things to do, to get started working for someone else. Patient discussed having to set boundaries with new employer and new coworkers. Patient shared he has \"lots of spare time\". Patient wondered if he is adding chaos to his daily life because this is what is he use to and is comfortable in.      Nirali Briceño LADC, [facility] [place]    "

## 2023-03-07 ENCOUNTER — HOSPITAL ENCOUNTER (OUTPATIENT)
Dept: BEHAVIORAL HEALTH | Facility: CLINIC | Age: 43
Discharge: HOME | End: 2023-03-07
Attending: FAMILY MEDICINE
Payer: COMMERCIAL

## 2023-03-07 PROCEDURE — 90853 GROUP PSYCHOTHERAPY: CPT | Mod: GT

## 2023-03-08 NOTE — PROGRESS NOTES
Group Therapy Documentation     Was the patient seen in-person or via Telemedicine (if in-person skip to Group Note): Telemedicine    If Telemedicine: The patient's condition can be safely assessed and treated via synchronous audio and visual telemedicine encounter. ? ?      Reason for Telemedicine Visit: Services only offered telehealth    Originating Site (Patient Location): Patient's home    Distant Site (Provider Location): Provider Remote Setting- Home Office    Consent: ?The patient/guardian has verbally consented to: the potential risks and benefits of telemedicine (video visit) versus in person care; bill my insurance or make self-payment for services provided; and responsibility for payment of non-covered services.       Mode of Communication:??Video Conference via Zoom      As the provider I attest to compliance with applicable laws and regulations related to telemedicine.      Counselor verified Patient with 2-point verification: Patient is known to provider      Patient attended a video group session on the following days: ?          GROUP NOTE:  DATE OF SERVICE:  March 7, 2023    START TIME:  5:30pm    END TIME:  7:23pm    Group Length:   113 minutes    FACILITATOR(S):   Nirali CEDILLO [facility] [place]     TOPIC: BEH Group Therapy, Problem Gambling Group     Number of patients attending the group: 9    Group Therapy Type:  Problem Gambling Group    Group Attendance:  Client attended group     Summary of Group / Topics Discussed:   Group started with an introduction of our new group member. Each patient took turns with introducing themselves and sharing some information about their gambling and their recovery. Each patient then took turns for check in and identifying two feelings. Writer reviewed information shared at last Thursday s group to patients who were not in attendance. Going over worksheet with discussion on Busyness. Then we held a graduation ceremony for one patient that has completed our program. Writer  and each patient in group took turns to wish the graduating patient well and shared positive stories of how the graduating patient has impacted the group and group members. Graduating patient took time to thank writer and group members. After graduating patient signed off from group, discussion was held on the importance of checking your credit reports. Writer shared with group ways to attain credit reports and reasons to check this once per year. Discussion was held on apps that will also keep track of credit report for you for dimension 6.      Patient's response to the group topic/interactions:    Patient shared appreciation to graduating patient. Also was welcoming to new group member.       Client specific details:    Patient shared his new job is going well. 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 Patient reports he continues to journal and listening to podcasts as well as continuing to eat healthier. Patient thanked graduating member for connecting him with a specific GA group.       Nirali CEDILLO, CGC

## 2023-03-09 ENCOUNTER — HOSPITAL ENCOUNTER (OUTPATIENT)
Dept: BEHAVIORAL HEALTH | Facility: CLINIC | Age: 43
Discharge: HOME | End: 2023-03-09
Attending: FAMILY MEDICINE
Payer: COMMERCIAL

## 2023-03-09 PROCEDURE — 90853 GROUP PSYCHOTHERAPY: CPT | Mod: 95

## 2023-03-09 NOTE — PROGRESS NOTES
"Patient:  Jose Antonio Krause                         Date of Assessment: 12/09/2022            Problem Gambling Progress Note and Treatment Plan Review     Attendance  Group/Individual: Phase II       Groups Attended: 03/07/2023, 03/09/2023    Support group attended this week: yes    Reporting sobriety:  yes    Client Treatment Goals:   1.\"Stop gambling\"  2.\"Getting rid of depression\"  3.\"Builiding a healthier mind frame\"      Treatment Plan     Treatment Plan Review competed on: March 9, 2023    Staff Members contributing:  Carla Goldstein, [facility], [facility] [place] & MAMADOU Nelson, [facility] [place]                         Received Supervision:  yes    Client: contributed to goals and plan.  Client received copy of plan/revised plan: Yes  Client agrees with plan/revised plan: Yes    Changes to Treatment Plan: No  New Goals added since last review:  No additional goals added at this time.    Goals worked on since last review:  Dimension 1 Patient reports no gambling.   Dimension 2 No reported change in health.   Dimension 3 Patient reports journaling.   Dimension 4 Patient continues to attend group sessions weekly.   Dimension 5 Patient attended group and added to discussion on topic: Checking your Credit Report - Difference between credit reports and credit score.  Dimension 6 Patient reports new job is \"going well\". Mentioned continued attendance with GA and  eating healthier. Patient attended group with Gamblers Anonymous speaker.       Strategies effective: yes    Strategies need these changes:   Continue to work on treatment plan goals.     Depression and Anxiety at Intake:   Patient's PHQ-9 score was 16 out of 27, indicating symptoms related to moderately severe depression.  Patient's CAROLANN-7 score was 13, indicating symptoms related to moderate anxiety.     Intake Dimension Ratings:    Dimension 1  0    Dimension 2  0    Dimension 3  2    Dimension 4  0    Dimension 5  3    Dimension 6  0      Guide to Risk Ratings for Suicidality: "   IDEATION: Active thoughts of suicide? INTENT: Intent to follow on suicide? PLAN: Plan to follow through on suicide? Level of Risk:   IF Yes Yes Yes Patient = High Emergent   IF Yes Yes No Patient = High Urgent/Non-Emergent   IF Yes No No Patient = Moderate Non-Urgent   IF No No   No Patient = Low Risk   The patient's ADDITIONAL RISK FACTORS and lack of PROTECTIVE FACTORS may increase their overall suicide risk ratings.     Patient's/client's current risk rating:  Low Risk    Safety Plan on File  No risk identified     Family Involvement:   none schedule this week    Data:   client did actively participate      Intervention:   Education  Group feedback      Assessment:   Stages of Change Model  Action    Appears/Sounds:  Motivated  Engaged      Plan:  Focus on recovery environment      Nirali CEDILLO, CGC

## 2023-03-10 NOTE — PROGRESS NOTES
Group Therapy Documentation     Was the patient seen in-person or via Telemedicine (if in-person skip to Group Note): Telemedicine    If Telemedicine: The patient's condition can be safely assessed and treated via synchronous audio and visual telemedicine encounter. ? ?      Reason for Telemedicine Visit: Services only offered telehealth    Originating Site (Patient Location): Patient's home    Distant Site (Provider Location): Provider Remote Setting- Home Office    Consent: ?The patient/guardian has verbally consented to: the potential risks and benefits of telemedicine (video visit) versus in person care; bill my insurance or make self-payment for services provided; and responsibility for payment of non-covered services.       Mode of Communication:??Video Conference via Zoom      As the provider I attest to compliance with applicable laws and regulations related to telemedicine.      Counselor verified Patient with 2-point verification: Patient is known to provider      Patient attended a video group session on the following days: ?          GROUP NOTE:  DATE OF SERVICE:  March 9, 2023    START TIME:  5:54pm    END TIME:  7:18pm    Group Length:   84 minutes    FACILITATOR(S):    Nirali CEDILLO [facility] [place]    TOPIC: BEH Group Therapy, Problem Gambling Group     Number of patients attending the group: 7    Group Therapy Type:  Problem Gambling Group    Group Attendance:  Client attended group     Summary of Group / Topics Discussed:   Group started with the Gamblers Anonymous speaker for dimension 6. Speaker shared his story of gambling and recovery. Patient's took turns asking questions about GA and about sponsorship. After speaker left the Zoom session, discussion continued about GA meetings, the yellow book and the One Day at a Time devotional book from GA. Patients that were not in attendance for Tuesday s group introduced themselves to new member. Each patient took turns with check in and identifying two feelings.  Group ended with a Positive Thoughts reading.      Patient's response to the group topic/interactions:    Patient appeared to gain knowledge about Gamblers Anonymous.       Client specific details:     Patient arrived at 5:54pm. Patient did call writer before group to advise he would be late to group.   Patient asked GA speaker how can he connect with a sponsor. Patient shared with group he is thankful for his family.       Nirali CEDILLO, [facility] [place]

## 2023-03-14 ENCOUNTER — HOSPITAL ENCOUNTER (OUTPATIENT)
Dept: BEHAVIORAL HEALTH | Facility: CLINIC | Age: 43
Discharge: HOME | End: 2023-03-14
Attending: FAMILY MEDICINE
Payer: COMMERCIAL

## 2023-03-14 PROCEDURE — 90853 GROUP PSYCHOTHERAPY: CPT | Mod: 95

## 2023-03-15 NOTE — PROGRESS NOTES
"Group Therapy Documentation     Was the patient seen in-person or via Telemedicine (if in-person skip to Group Note): Telemedicine    If Telemedicine: The patient's condition can be safely assessed and treated via synchronous audio and visual telemedicine encounter. ? ?      Reason for Telemedicine Visit: Services only offered telehealth    Originating Site (Patient Location): Patient's home    Distant Site (Provider Location): Provider Remote Setting- Home Office    Consent: ?The patient/guardian has verbally consented to: the potential risks and benefits of telemedicine (video visit) versus in person care; bill my insurance or make self-payment for services provided; and responsibility for payment of non-covered services.       Mode of Communication:??Video Conference via Zoom      As the provider I attest to compliance with applicable laws and regulations related to telemedicine.      Counselor verified Patient with 2-point verification: Patient is known to provider      Patient attended a video group session on the following days: ?          GROUP NOTE:  DATE OF SERVICE:  March 14, 2023    START TIME:  5:30pm    END TIME:  7:16pm    Group Length:   106 minutes    FACILITATOR(S):    Nirali CEDILLO [facility] [place]     TOPIC: BEH Group Therapy, Problem Gambling Group     Number of patients attending the group: 8    Group Therapy Type:  Problem Gambling Group    Group Attendance:  Client attended group     Summary of Group / Topics Discussed:   Group started with each patient sharing for check in and identifying two feelings. Writer shared with group the \"Encourage me text\". To have another way to get support in recovery. Group went over chapter 28 in the No-Dice workbook - Support. Discussion was held on Gamblers Anonymous. Writer shared a short introductory video to group on SMART Recovery. Also went through website and one lesson in the SMART Recovery workbook for dimension 5. Sharing with group an alternative to Gamblers " "Anonymous. Group ended with a Positive Thoughts reading.      Patient's response to the group topic/interactions:   Patient appeared to gain a better understanding of support groups GA and SMART Recovery.       Client specific details:    Patient shared spending time with family this past weekend at a birthday party and gender reveal of his new niece. Patient shared with group he had previously signed up for the \"Encourage me text\". Patient shared he has been asked to co-host a GA meeting weekly. Patient reports he is unsure if he would want to do this since it is on Saturday nights. Patient shared with group the GA meaning of God of your understanding. Multiple group members thanked his for this.       Nirali CEDILLO, [facility] [place]   "

## 2023-03-16 ENCOUNTER — HOSPITAL ENCOUNTER (OUTPATIENT)
Dept: BEHAVIORAL HEALTH | Facility: CLINIC | Age: 43
Discharge: HOME | End: 2023-03-16
Attending: FAMILY MEDICINE
Payer: COMMERCIAL

## 2023-03-16 PROCEDURE — 90853 GROUP PSYCHOTHERAPY: CPT | Mod: 95

## 2023-03-16 NOTE — PROGRESS NOTES
"Patient:  Jose Antonio Krause                         Date of Assessment: 12/09/2022            Problem Gambling Progress Note and Treatment Plan Review     Attendance  Group/Individual: Phase II      Groups Attended: 03/14/23,03/16/23    Support group attended this week: yes    Reporting sobriety:  yes    Client Treatment Goals:   1.\"Stop gambling\"  2.\"Getting rid of depression\"  3.\"Builiding a healthier mind frame\"      Treatment Plan     Treatment Plan Review competed on: March 16, 2023    Staff Members contributing:  Carla Goldstein, [facility], [facility] [place] & MAMADOU Nelson, [facility] [place]                         Received Supervision:  yes    Client: contributed to goals and plan.  Client received copy of plan/revised plan: Yes  Client agrees with plan/revised plan: Yes    Changes to Treatment Plan: No  New Goals added since last review:  No additional goals added at this time.      Goals worked on since last review:  Dimension 1 Patient reports no gambling.   Dimension 2 No reported change in health.   Dimension 3 Patient continues to use feelings chart and emotions wheel. Patient attended group and added to discussion on to topic of suicide rate with compulsive gamblers ways to get help and personal stories were shared.   Dimension 4 Patient continues to attend group sessions weekly.   Dimension 5 Group session attended on Tuesday was information and discussion on Gamblers Anonymous and SMART Recovery. Patient took time to discuss with other group members how GA defines spirituality and how this is dealt with in different GA meetings.   Dimension 6 Patient shared he was asked to co-host a weekly GA meeting. Patient is unsure if he would like to do this since it is on Saturday nights.     Strategies effective: yes    Strategies need these changes:   Continue to work on treatment plan goals.     Depression and Anxiety at Intake:   Patient's PHQ-9 score was 16 out of 27, indicating symptoms related to moderately severe depression. "  Patient's CAROLANN-7 score was 13, indicating symptoms related to moderate anxiety.        Intake Dimension Ratings:    Dimension 1  0    Dimension 2  0    Dimension 3  2    Dimension 4  0    Dimension 5  3    Dimension 6  0      Guide to Risk Ratings for Suicidality:   IDEATION: Active thoughts of suicide? INTENT: Intent to follow on suicide? PLAN: Plan to follow through on suicide? Level of Risk:   IF Yes Yes Yes Patient = High Emergent   IF Yes Yes No Patient = High Urgent/Non-Emergent   IF Yes No No Patient = Moderate Non-Urgent   IF No No   No Patient = Low Risk   The patient's ADDITIONAL RISK FACTORS and lack of PROTECTIVE FACTORS may increase their overall suicide risk ratings.     Patient's/client's current risk rating:  Low Risk    Safety Plan on File  No risk identified     Family Involvement:   none schedule this week    Data:   client did actively participate      Intervention:   Education  Group feedback      Assessment:   Stages of Change Model  Action    Appears/Sounds:  Engaged      Plan:  Focus on recovery environment      Nirali CEDILLO, CGC

## 2023-03-17 NOTE — PROGRESS NOTES
Group Therapy Documentation     Was the patient seen in-person or via Telemedicine (if in-person skip to Group Note): Telemedicine    If Telemedicine: The patient's condition can be safely assessed and treated via synchronous audio and visual telemedicine encounter. ? ?      Reason for Telemedicine Visit: Services only offered telehealth    Originating Site (Patient Location): Patient's home    Distant Site (Provider Location): Provider Remote Setting- Home Office    Consent: ?The patient/guardian has verbally consented to: the potential risks and benefits of telemedicine (video visit) versus in person care; bill my insurance or make self-payment for services provided; and responsibility for payment of non-covered services.       Mode of Communication:??Video Conference via Zoom      As the provider I attest to compliance with applicable laws and regulations related to telemedicine.      Counselor verified Patient with 2-point verification: Patient is known to provider      Patient attended a video group session on the following days: ?          GROUP NOTE:  DATE OF SERVICE:  March 16, 2023    START TIME:  5:30pm    END TIME:  6:56pm     Group Length:   86 minutes    FACILITATOR(S):    Nirali CEDILLO [facility]     TOPIC: BEH Group Therapy, Problem Gambling Group     Number of patients attending the group: 4    Group Therapy Type:  Problem Gambling Group    Group Attendance:  Client attended group     Summary of Group / Topics Discussed:   Group stared with each patient taking turns for check in and identifying two feelings. Writer shared with group that in [facility], family members can set up an exclusion order so the gambler in their family can be banned from the casinos. This is the only place in the world that offers this. Discussion was held on how this would affect the gambler. Writer showed a video of a news story on suicide rates and compulsive gamblers. Discussion was held on suicide rates and ways to get help;  "personal stories were shared for dimension 3. Group ended with a Positive Thoughts reading.      Patient's response to the group topic/interactions:    Patient appeared to gain a better understanding of ways to reach out for help.       Client specific details:   Patient shared with group he will be hosting his first GA meeting tonight for his 9 pm group. Patient shared excitement to be hosting. Patient shared with group today he realized he was no longer afraid to open his mail. Patient discussed how difficult it was before with all the bills and sometimes just not dealing with the mail at all, and not opening it. Now patient shared he is able to open the bills knowing he is has a plan to pay them. Patient shared with group his \"Day 0\" patient shared his suicide stories. Giving details of what he was thinking and what his plans were and how this last time he reached out for help. Patient shared its \"easy\" to share these stories now. Writer commended patient on being so open and honest. Patient shared he is open to share his story to help others.       Nirali MCALLISTERC, [facility] [place]   "

## 2023-03-21 ENCOUNTER — HOSPITAL ENCOUNTER (OUTPATIENT)
Dept: BEHAVIORAL HEALTH | Facility: CLINIC | Age: 43
Discharge: HOME | End: 2023-03-21
Attending: FAMILY MEDICINE
Payer: COMMERCIAL

## 2023-03-21 PROCEDURE — 90853 GROUP PSYCHOTHERAPY: CPT | Mod: 95

## 2023-03-22 NOTE — PROGRESS NOTES
Group Therapy Documentation     Was the patient seen in-person or via Telemedicine (if in-person skip to Group Note): Telemedicine    If Telemedicine: The patient's condition can be safely assessed and treated via synchronous audio and visual telemedicine encounter. ? ?      Reason for Telemedicine Visit: Services only offered telehealth    Originating Site (Patient Location): Patient's home    Distant Site (Provider Location): Provider Remote Setting- Home Office    Consent: ?The patient/guardian has verbally consented to: the potential risks and benefits of telemedicine (video visit) versus in person care; bill my insurance or make self-payment for services provided; and responsibility for payment of non-covered services.       Mode of Communication:??Video Conference via Zoom      As the provider I attest to compliance with applicable laws and regulations related to telemedicine.      Counselor verified Patient with 2-point verification: Patient is known to provider      Patient attended a video group session on the following days: ?          GROUP NOTE:  DATE OF SERVICE:  March 21, 2023    START TIME:  5:30pm    END TIME:  7:20pm    Group Length:   110 minutes    FACILITATOR(S):   Nirali CEDILLO [facility]     TOPIC: BEH Group Therapy, Problem Gambling Group     Number of patients attending the group: 8    Group Therapy Type:  Problem Gambling Group    Group Attendance:  Client attended group     Summary of Group / Topics Discussed:   Group started with each patient taking turns for check in. Discussion on new question asked at check in: Any thoughts of self harm or suicide. Also more information on how to reach out for help and suicide rates with compulsive gambling for dimension 3.Then main topic of cross addiction for dimension 5. Group ended with a Positive Thoughts reading.     Topic: Cross Addiction for Dimension 5    This topic will give a general overview of addiction and cross addiction     Objective(s):      Client will gain insight into having a drug or alcohol dependence while having a gambling problem.     Client will gain insight into the possibility of starting a new dependence after quitting gambling.     Client will gain insight into cross addiction and the importance of balancing all aspects of their lives.      Structure:     Utilizing No-Dice Book Chapter 27 Cross - Addiction    Review DSM-V criteria for substance use disorder and Gambling Disorder     Facilitate group discussion around each patient s experience of DSM-V criteria    Asking clients to identify any possible behavior addictions/cross addictions they see in their life.      Expected therapeutic outcomes:      Understanding the concept of trading one substance/behavior for another.     Helping to decrease risk of addiction to another substances/behavior     Gain insight into the importance of balancing all areas of our lives.      Therapeutic outcome(s) measured by:     Patient s ability to teach-back information learned in group.     Patient s ability to identify which criteria they meet in relation to their gambling diagnosis(s)       Patient's response to the group topic/interactions:   Patient appeared to gain a better understanding of suicide rates with compulsive gambling, ways to reach out for help and about cross addictions.       Client specific details:     Patient shared spending time with family for step father's birthday party this past weekend. Patient discussed hosting his first GA meeting last Thursday. Patient shared how much he enjoyed this and is looking forward to covering for other hosts and eventually having his own group to host in the future. Patient shared using meditation this week for self care. Patient shared his previous cross addictions of pain medications, marijuana and drinking. Patient shared his current cross addiction of chewing tobacco. Mentioning he has done this since he was 13 years old.     Nirali Briceño [facility],  CGC

## 2023-03-23 ENCOUNTER — HOSPITAL ENCOUNTER (OUTPATIENT)
Dept: BEHAVIORAL HEALTH | Facility: CLINIC | Age: 43
Discharge: HOME | End: 2023-03-23
Attending: FAMILY MEDICINE
Payer: COMMERCIAL

## 2023-03-23 PROCEDURE — 90853 GROUP PSYCHOTHERAPY: CPT | Mod: 95

## 2023-03-23 NOTE — PROGRESS NOTES
"Patient:  Jose Antonio Krause                         Date of Assessment: 12/09/2022            Problem Gambling Progress Note and Treatment Plan Review     Attendance  Group/Individual: Phase II      Groups Attended: 03/21/23, 03/23/23    Support group attended this week: yes    Reporting sobriety:  yes    Client Treatment Goals:   1.\"Stop gambling\"  2.\"Getting rid of depression\"  3.\"Builiding a healthier mind frame\"      Treatment Plan     Treatment Plan Review competed on: March 23, 2023    Staff Members contributing:  Carla Goldstein, [facility], [facility] & MAMADOU Nelson, [facility]                         Received Supervision:  yes    Client: contributed to goals and plan.  Client received copy of plan/revised plan: Yes  Client agrees with plan/revised plan: Yes    Changes to Treatment Plan: No  New Goals added since last review:  No additional goals added at this time.    Goals worked on since last review:  Dimension 1 Patient reports no gambling.   Dimension 2 No reported change in health.   Dimension 3 Patient attended groups and added to discussions on the topic of suicide and Cognitive Biases. Patient reported using meditation for mindfulness.   Dimension 4 Patient continues to attend group sessions weekly.   Dimension 5 Patient attend groups and added to discussions on Cross Addiction and Thought Defusion.  Dimension 6 Patient reported hosting his first GA meeting last week and one this week on Thursday after our group session.     Strategies effective: Yes    Strategies need these changes:   Continue to work on treatment plan goals.        Depression and Anxiety at Intake:   Patient's PHQ-9 score was 16 out of 27, indicating symptoms related to moderately severe depression.  Patient's CAROLANN-7 score was 13, indicating symptoms related to moderate anxiety.        Intake Dimension Ratings:    Dimension 1  0    Dimension 2  0    Dimension 3  2    Dimension 4  0    Dimension 5  3    Dimension 6  0      Guide to Risk " Ratings for Suicidality:   IDEATION: Active thoughts of suicide? INTENT: Intent to follow on suicide? PLAN: Plan to follow through on suicide? Level of Risk:   IF Yes Yes Yes Patient = High Emergent   IF Yes Yes No Patient = High Urgent/Non-Emergent   IF Yes No No Patient = Moderate Non-Urgent   IF No No   No Patient = Low Risk   The patient's ADDITIONAL RISK FACTORS and lack of PROTECTIVE FACTORS may increase their overall suicide risk ratings.     Patient's/client's current risk rating:  Low Risk  Patient identified no thoughts of self-harm or suicide.  Safety Plan on File      Family Involvement:   none schedule this week    Data:   client did actively participate      Intervention:   Counselor feedback  Education  Group feedback      Assessment:   Stages of Change Model  Action    Appears/Sounds:  Engaged      Plan:  Focus on recovery environment      Nirali CEDILLO, CGC

## 2023-03-24 NOTE — PROGRESS NOTES
Group Therapy Documentation     Was the patient seen in-person or via Telemedicine (if in-person skip to Group Note): Telemedicine    If Telemedicine: The patient's condition can be safely assessed and treated via synchronous audio and visual telemedicine encounter. ? ?      Reason for Telemedicine Visit: Services only offered telehealth    Originating Site (Patient Location): Patient's home    Distant Site (Provider Location): Provider Remote Setting- Home Office    Consent: ?The patient/guardian has verbally consented to: the potential risks and benefits of telemedicine (video visit) versus in person care; bill my insurance or make self-payment for services provided; and responsibility for payment of non-covered services.       Mode of Communication:??Video Conference via Zoom      As the provider I attest to compliance with applicable laws and regulations related to telemedicine.      Counselor verified Patient with 2-point verification: Patient is known to provider      Patient attended a video group session on the following days: ?          GROUP NOTE:  DATE OF SERVICE:  March 23, 2023    START TIME:  5:30pm    END TIME:  7:20pm    Group Length:   110 minutes    FACILITATOR(S):    Nirali CEDILLO [facility]     TOPIC: BEH Group Therapy, Problem Gambling Group     Number of patients attending the group: 6    Group Therapy Type:  Problem Gambling Group    Group Attendance:  Client attended group     Summary of Group / Topics Discussed:   Group started with a welcome and introduction of new patient. Each group member took turns with introduction and patient shared information about himself and his gambling. Follow up discussion was held on Cross Addictions from Tuesday's group. Writer shared with group the criteria for DIANE, Gambling and Abdulaziz. Discussion was held on Thought Defusion, Cognitive Distancing Techniques for dimension 5. Patients identified which techniques have or may work for them when they feel: triggered  "are having one sided thought or are lost in thought. Article was read on Cognitive Biases for dimension 3. Patients read about different ways a person could have a cognitive biases and shared which ones they related to. Group ended with a sharing of book recommendations for recovery from multiple group members and a reading from A Year of Positive Thoughts.      Patient's response to the group topic/interactions:   Patient appeared to gain a better understanding with Thought Defusion and Cognitive Biases.       Client specific details:   Patient shared he will be leading another GA group meeting tonight. Patient shared excitement for being able to help others. Patient identified his thought defusion as \"passing in the clouds\". How he thinks of putting his negative thoughts on a cloud \"and they just float away\". Patient shared he sees a lot of Anchoring bias with cognitive biases, where people will believe the first thing they hear. Patient shared with group member different books he has read in recovery.       Nirali Briceño LADC, [facility] [place]   "

## 2023-03-28 ENCOUNTER — HOSPITAL ENCOUNTER (OUTPATIENT)
Dept: BEHAVIORAL HEALTH | Facility: CLINIC | Age: 43
Discharge: HOME | End: 2023-03-28
Attending: FAMILY MEDICINE
Payer: COMMERCIAL

## 2023-03-28 PROCEDURE — 90853 GROUP PSYCHOTHERAPY: CPT | Mod: 95

## 2023-03-29 ENCOUNTER — TELEPHONE (OUTPATIENT)
Dept: BEHAVIORAL HEALTH | Facility: CLINIC | Age: 43
End: 2023-03-29
Payer: COMMERCIAL

## 2023-03-29 NOTE — TELEPHONE ENCOUNTER
Email to patient asking to set up a time for an individual session next week to update treatment plan and discuss moving up to Phase III in programming.

## 2023-03-29 NOTE — PROGRESS NOTES
Group Therapy Documentation     Was the patient seen in-person or via Telemedicine (if in-person skip to Group Note): Telemedicine    If Telemedicine: The patient's condition can be safely assessed and treated via synchronous audio and visual telemedicine encounter. ? ?      Reason for Telemedicine Visit: Services only offered telehealth    Originating Site (Patient Location): Patient's home    Distant Site (Provider Location): Provider Remote Setting- Home Office    Consent: ?The patient/guardian has verbally consented to the potential risks and benefits of telemedicine (video visit) versus in person care; bill my insurance or make self-payment for services provided; and responsibility for payment of non-covered services.       Mode of Communication:??Video Conference via Zoom      As the provider I attest to compliance with applicable laws and regulations related to telemedicine.      Counselor verified Patient with 2-point verification: Patient is known to provider      Patient attended a video group session on the following days: ?          GROUP NOTE:  DATE OF SERVICE:  March 28, 2023    START TIME:  5:30pm    END TIME:  7:35pm    Group Length:   125 Minutes    FACILITATOR(S):    Nirali CEDILLO     TOPIC: BEH Group Therapy, Problem Gambling Group     Number of patients attending the group: 11    Group Therapy Type:  Problem Gambling Group    Group Attendance:  Client attended group     Summary of Group / Topics Discussed:   Group started with introduction of new group members. Everyone took turns introducing themselves and welcoming the new members. Discussion was continued from last week on Cognitive Biases. Information given from writer to group on causes of bias, impact of Cognitive Bias and tips for overcoming bias. Writer introduced to group an online gamers anonymous website and different Zoom options if they would like to attend or share with someone else. Group took turns reading a handout on Talking  "with Children about Gambling. Information from the Division on Addiction at [facility], a teaching affiliate of [employer]. Discussion was held on group members childhood connections with gambling and talking to their children/younger family members about gambling for Dimension 6. Group ended with a Positive Thoughts reading.      Patient's response to the group topic/interactions:    Patient appeared to gain a better understanding of Cognitive Biases, codi anonymous meetings and how to talk to children about gambling.       Client specific details:     Patient shared he hosted another GA meeting last Thursday night and co hosted a meeting on Sunday. Patient shared his excitement with being able to help other in recovery. Patient shared he used the Thought Defusion technique of putting it on the cloud. Taking his negative thoughts and \"putting them\" on a cloud to float away. Patient shared when he was younger seeing his father win a large amount of money from the lottery. Patient identified thinking how easy it is to walker and win. Patient mentioned having open discussion with his two adult sons about gambling and his recovery.       Nirali CEDILLO, [facility]   "

## 2023-03-30 ENCOUNTER — HOSPITAL ENCOUNTER (OUTPATIENT)
Dept: BEHAVIORAL HEALTH | Facility: CLINIC | Age: 43
Discharge: HOME | End: 2023-03-30
Attending: FAMILY MEDICINE
Payer: COMMERCIAL

## 2023-03-30 PROCEDURE — 90853 GROUP PSYCHOTHERAPY: CPT | Mod: GT

## 2023-03-30 NOTE — PROGRESS NOTES
"Patient:  Jose Antonio Krause                         Date of Assessment: 12/09/2022            Problem Gambling Progress Note and Treatment Plan Review     Attendance  Group/Individual: Phase II      Groups Attended: 03/28/23. 03/30/23    Support group attended this week: yes    Reporting sobriety:  yes    Client Treatment Goals:   1.\"Stop gambling\"  2.\"Getting rid of depression\"  3.\"Builiding a healthier mind frame\"      Treatment Plan     Treatment Plan Review competed on: March 30, 2023    Staff Members contributing:  Carla Goldstein, [facility], [facility] & MAMADOU Nelson, [facility]                         Received Supervision:  yes    Client: contributed to goals and plan.  Client received copy of plan/revised plan: Yes  Client agrees with plan/revised plan: Yes    Changes to Treatment Plan: Yes  New Goals added since last review:  No additional goals added at this time.    Goals worked on since last review:  Dimension 1 Patient reported no gambling.   Dimension 2 No reported changes in health. Patient attended group and added to discussion on Post-Acute Withdrawal Syndrome (PAWS).  Dimension 3 Patient reports continued to work on emotion regulation. Patient attended group and shared his experiences with topic of Coping Skills.   Dimension 4 Patient continues to work on treatment plan goals.   Dimension 5 Patient shared teach back on topic from last week Thought Defusion.   Dimension 6 Patient attended group and added to discussion on the topic of Talking with Children about Gambling. Patient shared hosting and co-hosting GA groups this past week.     Strategies effective: yes    Strategies need these changes:   Continue to work on treatment plan goals.     Depression and Anxiety at Intake:   Patient's PHQ-9 score was 16 out of 27, indicating symptoms related to moderately severe depression.  Patient's CAROLANN-7 score was 13, indicating symptoms related to moderate anxiety.        Intake Dimension Ratings:    Dimension 1  0    " Dimension 2  0    Dimension 3  2    Dimension 4  0    Dimension 5  3    Dimension 6  0      Guide to Risk Ratings for Suicidality:   IDEATION: Active thoughts of suicide? INTENT: Intent to follow on suicide? PLAN: Plan to follow through on suicide? Level of Risk:   IF Yes Yes Yes Patient = High Emergent   IF Yes Yes No Patient = High Urgent/Non-Emergent   IF Yes No No Patient = Moderate Non-Urgent   IF No No   No Patient = Low Risk   The patient's ADDITIONAL RISK FACTORS and lack of PROTECTIVE FACTORS may increase their overall suicide risk ratings.     Patient's/client's current risk rating:  Low Risk  Patient reports no thoughts of self-harm or suicide.  Safety Plan on File      Family Involvement:   none schedule this week    Data:   client did actively participate      Intervention:   Education  Group feedback      Assessment:   Stages of Change Model  Action    Appears/Sounds:  Engaged      Plan:  Focus on recovery environment      Nirali CEDILLO, CGC

## 2023-03-31 NOTE — PROGRESS NOTES
Group Therapy Documentation     Was the patient seen in-person or via Telemedicine (if in-person skip to Group Note): Telemedicine    If Telemedicine: The patient's condition can be safely assessed and treated via synchronous audio and visual telemedicine encounter. ? ?      Reason for Telemedicine Visit: Services only offered telehealth    Originating Site (Patient Location): Patient's Home    Distant Site (Provider Location): Provider Remote Setting- Home Office    Consent: ?The patient/guardian has verbally consented to: the potential risks and benefits of telemedicine (video visit) versus in person care; bill my insurance or make self-payment for services provided; and responsibility for payment of non-covered services.       Mode of Communication:??Video Conference via Zoom      As the provider I attest to compliance with applicable laws and regulations related to telemedicine.      Counselor verified Patient with 2-point verification: Patient is known to provider      Patient attended a video group session on the following days: ?          GROUP NOTE:  DATE OF SERVICE:  March 30, 2023    START TIME:  5:30pm    END TIME:  7:20pm    Group Length:   110 minutes    FACILITATOR(S):    Nirali CEDILLO CGC     TOPIC: BEH Group Therapy, Problem Gambling Group     Number of patients attending the group: 8    Group Therapy Type:  Problem Gambling Group    Group Attendance:  Client attended group       Summary of Group / Topics Discussed:   Group started with each member taking turns for check in and identifying two feelings. Group had follow up discussions on topics from Tuesday's group: Gamers Anonymous and Talking with Children about Gambling. Group went over Chapter 9 in No-Dice Book on PAWS, See below for dimension 2. The second topic of the night was on Coping Skills for dimension 3. Writer showed a short video that gave a description of what are coping skills. That was followed by discussion of specific coping  "skills group members have/are using. Group ended with a Positive Thoughts reading.      Psychoeducation/Skills:?Post-Acute Withdrawal Syndrome (PAWS)   ?   This topic will give a general overview of how quitting gambling can continue to affect our physical and mental health and discuss healthy skills to cope with PAWS.    ?   Objective(s):    ?   1. Patient will identify at least 2 symptoms of PAW they have experienced and or are currently experiencing   2. Patient will identify at least 2 strategies they can use to help cope with PAWS   ?   Structure:   ?   1. Provide psychoeducation on Post-Acute Withdrawal Syndrome.    2. Facilitate group discussion around each patient's experiences with PAWS.   3. Use teach-back techniques to ensure patients' understanding.   4. Use workbook presentation to enhance learning process.    ?   Expected therapeutic outcomes:    ?   1. Understand signs and symptoms of PAWS   2. Increase ability to utilize healthy strategies to cope with PAWS   1. Help clients understand that the recovery process takes time   ?   Therapeutic outcome(s) measured by:    ?   1. Patients  ability to teach-back information learned in group.   1. Patient's demonstration of learning by identification of their own symptoms and coping strategies of relating to PAWS.        Patient's response to the group topic/interactions:   Patient appeared to gain a better understanding of PAWS and Coping Skills.       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 Patient also shared he will reach out to GA members and other GA members and group members have reached out to him for support. Patient reported his PAWS symptoms of difficulty in thinking and impulsiveness. Patient shared that early in recovery he would listen to recovery podcasts and watch recovery [facility] video. Patient reports not he uses the phrase \"it will pass\" to help him in these situations.     Nirali Briceño [facility], " CGC

## 2023-04-04 ENCOUNTER — HOSPITAL ENCOUNTER (OUTPATIENT)
Dept: BEHAVIORAL HEALTH | Facility: CLINIC | Age: 43
Discharge: HOME | End: 2023-04-04
Attending: FAMILY MEDICINE
Payer: COMMERCIAL

## 2023-04-04 PROCEDURE — 90853 GROUP PSYCHOTHERAPY: CPT | Mod: 95

## 2023-04-05 ENCOUNTER — HOSPITAL ENCOUNTER (OUTPATIENT)
Dept: BEHAVIORAL HEALTH | Facility: CLINIC | Age: 43
Discharge: HOME | End: 2023-04-05
Attending: FAMILY MEDICINE | Admitting: FAMILY MEDICINE
Payer: COMMERCIAL

## 2023-04-05 PROCEDURE — 90832 PSYTX W PT 30 MINUTES: CPT | Mod: 95

## 2023-04-05 ASSESSMENT — ANXIETY QUESTIONNAIRES
7. FEELING AFRAID AS IF SOMETHING AWFUL MIGHT HAPPEN: NOT AT ALL
6. BECOMING EASILY ANNOYED OR IRRITABLE: NOT AT ALL
2. NOT BEING ABLE TO STOP OR CONTROL WORRYING: NOT AT ALL
5. BEING SO RESTLESS THAT IT IS HARD TO SIT STILL: SEVERAL DAYS
4. TROUBLE RELAXING: NOT AT ALL
GAD7 TOTAL SCORE: 2
GAD7 TOTAL SCORE: 2
3. WORRYING TOO MUCH ABOUT DIFFERENT THINGS: NOT AT ALL
1. FEELING NERVOUS, ANXIOUS, OR ON EDGE: SEVERAL DAYS

## 2023-04-05 NOTE — PROGRESS NOTES
Individual counseling session:    Telemedicine Visit: The patient's condition can be safely assessed and treated via synchronous audio and visual telemedicine encounter.      Reason for Telemedicine Visit: Services only offered telehealth    Originating Site (Patient Location): Patient's place of employment    Distant Site (Provider Location): Provider Remote Setting- Home Office    Consent:  The patient/guardian has verbally consented to: the potential risks and benefits of telemedicine (video visit) versus in person care; bill my insurance or make self-payment for services provided; and responsibility for payment of non-covered services.     Mode of Communication:  Video Conference via [employer]    As the provider I attest to compliance with applicable laws and regulations related to telemedicine.    Counselor verified Patient with 2-point verification: Patient is known to provider.    Video visit start time: 8:00am  Video visit end time: 8:35pm    Length of session: 35 minutes    D: Writer and patient met for a scheduled individual video session via [employer]. Writer and patient discussed patient moving up to Phase III. Patient will be attending Thursday groups only going forward. Patient shared his excitement for his progress in programming and in running GA groups. Patient discussed looking into Peer Recovery in the future. Writer praised patient for his honesty and sharing his personal stories in group discussions. Patient and writer went over treatment plan and updated information. Patient shared his concerns for fellow group members. Patient shared he has reached out to group member with texts during group and after group. Group member has responded but patient is still concerned.   I: Counselor facilitated 1:1 session.  A: Patient appeared grateful for program and shared his happiness to be moving up to phase III .   P: Patient and writer will meet for an individual session again in about a month to discuss  graduation plans. Patient will attend groups on Thursday evenings going forward. Writer will follow up with group member patient discussed.       Nirali CEDILLO, [facility]

## 2023-04-05 NOTE — PROGRESS NOTES
Group Therapy Documentation     Was the patient seen in-person or via Telemedicine (if in-person skip to Group Note): Telemedicine    If Telemedicine: The patient's condition can be safely assessed and treated via synchronous audio and visual telemedicine encounter. ? ?      Reason for Telemedicine Visit: Services only offered telehealth    Originating Site (Patient Location): Patient's home    Distant Site (Provider Location): Provider Remote Setting- Home Office    Consent: ?The patient/guardian has verbally consented to: the potential risks and benefits of telemedicine (video visit) versus in person care; bill my insurance or make self-payment for services provided; and responsibility for payment of non-covered services.       Mode of Communication:??Video Conference via Zoom      As the provider I attest to compliance with applicable laws and regulations related to telemedicine.      Counselor verified Patient with 2-point verification: Patient is known to provider      Patient attended a video group session on the following days: ?          GROUP NOTE:  DATE OF SERVICE:  April 4, 2023    START TIME:  5:30pm    END TIME:  7:18pm    Group Length:   108 minutes    FACILITATOR(S):    Nirali CEDILLO, [facility] [place] [facility]    TOPIC: BEH Group Therapy, Problem Gambling Group     Number of patients attending the group: 8    Group Therapy Type:  Problem Gambling Group    Group Attendance:  Client attended group     Summary of Group / Topics Discussed:   Group started with the introduction of a new group member. Each person took turns to introduce themselves to new group member and shared information about their gambling and recovery. After introductions everyone took turns for check in and identifying two feelings. Discussion was held on last week s group topics on Post-Acute Withdrawal Syndrome (PAWS) and Coping Skills. Different group members shared ways they have used copings skills. Writer shared an article to group on Giving  "Thanks in Recovery from Addiction. The article had information on how to get through the holiday when dealing with family and recovery. The discussion following was about escaping the day-to-day stress of gambling to spend time with family. Also acknowledging the stress that comes along with family gatherings. Group watched a short video \"Recovery: How to Avoid Triggers\" for dimension 5. The video describes what is a trigger and ways to deal with them. Group members shared different ways they have identified triggers and how the cope with them. Group ended with a Positive Thoughts reading.      Patient's response to the group topic/interactions:    Patient appeared to gain a better understanding on how to deal with family for the holiday and information on what is a trigger and how to deal with them.       Client specific details:    Patient shared having urges this past weekend. Patient shared he was up late and was tired this weekend and this might have been a reason for urges. Patient shared with group he has started meditation and found he could keep his \"brain quite\" for up to 10 minutes. Patient shared he is working on his taxes and he had a plan set for when he downloaded his win/loss statement from the [employer]. He shared going to the store and reaching out the others, he was \"shocked at the amount listed on statement\" patient shared he did not realized how much money he had gambled last year. Patient shared how rewarding it is to be in service to others. Patient mentioned he hosted a GA meeting on Thursday and co hosted on Saturday. Patient shared triggers of certain songs and will now only listen to music at work and not at home or in truck.    Nirali CEDILLO, [facility]  "

## 2023-04-06 ENCOUNTER — HOSPITAL ENCOUNTER (OUTPATIENT)
Dept: BEHAVIORAL HEALTH | Facility: CLINIC | Age: 43
Discharge: HOME | End: 2023-04-06
Attending: FAMILY MEDICINE
Payer: COMMERCIAL

## 2023-04-06 PROCEDURE — 90853 GROUP PSYCHOTHERAPY: CPT | Mod: GT

## 2023-04-06 NOTE — PROGRESS NOTES
"Patient:  Jose Antonio Krause                         Date of Assessment: 12/09/2022            Problem Gambling Progress Note and Treatment Plan Review     Attendance  Group/Individual: Phase III       Groups Attended: 04/04/23, 04/06/2023 and individual session on 04/05/23    Support group attended this week: yes    Reporting sobriety:  yes    Client Treatment Goals:   1.\"Stop gambling\"  2.\"Getting rid of depression\"  3.\"Builiding a healthier mind frame\"      Treatment Plan     Treatment Plan Review competed on: April 6, 2023    Staff Members contributing:  Carla Goldstein, MAMADOU, [facility] [place] [facility] & MAMADOU Nelson, [facility] [place] [facility]                         Received Supervision:  yes    Client: contributed to goals and plan.  Client received copy of plan/revised plan: Yes  Client agrees with plan/revised plan: Yes    Changes to Treatment Plan: No  New Goals added since last review:  No additional goals added at this time.    Goals worked on since last review:  Dimension 1 Patient reported no gambling.   Dimension 2 No reported changes in health.  Dimension 3 Patient reports continued sessions with mental health therapist.   Dimension 4 Patient has been moved up to Phase III in programming and will attend only Thursday groups going forward.   Dimension 5 Patient identified having urges this past weekend due to lack of sleep. Patient attended groups and added to discussions on the topics of Triggers, The Emotional Meaning of Money and Money and Finances.   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 Patient shared he would like to have his own group to host for the next six months.     Strategies effective: yes    Strategies need these changes:   Continue to work on treatment plan goals.     Depression and Anxiety at Intake:   Patient's PHQ-9 score was 16 out of 27, indicating symptoms related to moderately severe depression.  Patient's CAROLANN-7 score was 13, indicating symptoms related " to moderate anxiety.        Intake Dimension Ratings:    Dimension 1  0    Dimension 2  0    Dimension 3  2    Dimension 4  0    Dimension 5  3    Dimension 6  0      Guide to Risk Ratings for Suicidality:   IDEATION: Active thoughts of suicide? INTENT: Intent to follow on suicide? PLAN: Plan to follow through on suicide? Level of Risk:   IF Yes Yes Yes Patient = High Emergent   IF Yes Yes No Patient = High Urgent/Non-Emergent   IF Yes No No Patient = Moderate Non-Urgent   IF No No   No Patient = Low Risk   The patient's ADDITIONAL RISK FACTORS and lack of PROTECTIVE FACTORS may increase their overall suicide risk ratings.     Patient's/client's current risk rating:  Low Risk  Patient reported no thoughts of self-harm or suicide.  Safety Plan on File      Family Involvement:   none schedule this week    Data:   client did actively participate      Intervention:   Counselor feedback  Education  Group feedback      Assessment:   Stages of Change Model  Maintenance    Appears/Sounds:  Motivated  Engaged      Plan:  Focus on recovery environment  Monitor emotional/physical health      MAMADOU Nelson, [facility]

## 2023-04-07 NOTE — PROGRESS NOTES
Group Therapy Documentation      Was the patient seen in-person or via Telemedicine (if in-person skip to Group Note): Telemedicine     If Telemedicine: The patient's condition can be safely assessed and treated via synchronous audio and visual telemedicine encounter. ? ?       Reason for Telemedicine Visit: Patient has requested telehealth visit     Originating Site (Patient Location): Patient's home     Distant Site (Provider Location): Provider Remote Setting- Home Office     Consent: ?The patient/guardian has verbally consented to: the potential risks and benefits of telemedicine (video visit) versus in person care; bill my insurance or make self-payment for services provided; and responsibility for payment of non-covered services.        Mode of Communication:??Video Conference via Zoom      As the provider I attest to compliance with applicable laws and regulations related to telemedicine.       Counselor verified Patient with 2-point verification: Patient is known to provider      Patient attended a video group session on the following days: ?           GROUP NOTE:  DATE OF SERVICE:  April 6, 2023     START TIME:  5:30pm     END TIME:  7:20pm     Group Length:   110 minutes     FACILITATOR(S):   MAMADOU Nelson, [facility] [place] [facility] [place]        TOPIC: BEH Group Therapy, Problem Gambling Group      Number of patients attending the group: 8     Group Therapy Type:  Problem Gambling Group     Group Attendance:  Client attended group      Summary of Group / Topics Discussed:   Group started with an introduction of new group member to another member that was not in attendance at Tuesday s group. Everyone took turns for check in and identifying two feelings. Discussion topics were on The Emotional Meaning of Money and Money and Finances for dimension 5. Both topics from the workbook No-Dice. Group ended with a Positive Thoughts reading.      Patient's response to the group topic/interactions:    Patient appeared to gain a better  awareness of how money has been a negative and positive impact in their lives.       Client specific details:   Patient arrived in group at 5:54pm. Patient mentioned he texted writer he was going to be late. But patient texted writers office number. Patient shared he had a session with his mental health therapist yesterday. Patient also shared he is speaking to more GA group members daily and continues to host and co-host GA meetings. Patient shared he identified money as a tool to be able to walker more. Also shared what he learned about money as a child from his mother.       Nirali Briceño, MAMADOU, [facility]

## 2023-04-13 ENCOUNTER — HOSPITAL ENCOUNTER (OUTPATIENT)
Dept: BEHAVIORAL HEALTH | Facility: CLINIC | Age: 43
Discharge: HOME | End: 2023-04-13
Attending: FAMILY MEDICINE
Payer: COMMERCIAL

## 2023-04-13 PROCEDURE — 90853 GROUP PSYCHOTHERAPY: CPT | Mod: GT

## 2023-04-14 NOTE — PROGRESS NOTES
Group Therapy Documentation     Was the patient seen in-person or via Telemedicine (if in-person skip to Group Note): Telemedicine    If Telemedicine: The patient's condition can be safely assessed and treated via synchronous audio and visual telemedicine encounter. ? ?      Reason for Telemedicine Visit: Services only offered telehealth    Originating Site (Patient Location): Patient's home    Distant Site (Provider Location): Provider Remote Setting- Home Office    Consent: ?The patient/guardian has verbally consented to: the potential risks and benefits of telemedicine (video visit) versus in person care; bill my insurance or make self-payment for services provided; and responsibility for payment of non-covered services.       Mode of Communication:??Video Conference via Zoom      As the provider I attest to compliance with applicable laws and regulations related to telemedicine.      Counselor verified Patient with 2-point verification: Patient is known to provider      Patient attended a video group session on the following days: ?          GROUP NOTE:  DATE OF SERVICE:  April 13, 2023    START TIME:  5:30pm    END TIME:  7:23pm    Group Length:   113 Minutes    FACILITATOR(S):   MAMADOU Nelson, [facility]    TOPIC: BEH Group Therapy, Problem Gambling Group     Number of patients attending the group: 4    Group Therapy Type:  Problem Gambling Group    Group Attendance:  Client attended group     Summary of Group / Topics Discussed:   Group started with an introduction to our GA speaker. GA speaker shared their journey before, during and after compulsive gambling. Patient discussed GA meetings/sponsorship and setting healthy limits for recovery for dimension 6. After speaker left group, discussion continued on topics of competitiveness, challenges and anger. Group went over page 17 from the GA yellow book. Each group member took turns with check in and identifying two feelings. Group ended with a Positive Thoughts  "reading.      Patient's response to the group topic/interactions:   Patient appeared to gain a better understanding of GA and long-term recovery.       Client specific details:    Patient shared he hosted/cohosted three GA meetings this week. Patient shared he also attended his first in person GA group on Tuesday. Patient shared he has identified that he has been dealing with his anger better then before, when he was gambling. Patient shared he is looking for an outlet for his \"competitive nature\". Patient shared he had his taxes completed and has a plan on how to pay in the money he owes. Patient reported he is learning to set limits with people he is connecting with in his GA groups. Patient identified having \"fleeting\" gambling thoughts.       Nirali Briceño, [employer], [facility]    "

## 2023-04-14 NOTE — PROGRESS NOTES
"Patient:  Jose Antonio Krause                         Date of Assessment: 12/09/2022            Problem Gambling Progress Note and Treatment Plan Review     Attendance  Group/Individual: Phase III       Groups Attended: 04/13/2023    Support group attended this week: yes    Reporting sobriety:  yes    Client Treatment Goals:   1.\"Stop gambling\"  2.\"Getting rid of depression\"  3.\"Builiding a healthier mind frame\"         Treatment Plan     Treatment Plan Review competed on: April 13, 2023    Staff Members contributing:  Carla Goldstein, MAMADOU, [facility] & MAMADOU Nelson, [facility]                         Received Supervision:  yes    Client: contributed to goals and plan.  Client received copy of plan/revised plan: Yes  Client agrees with plan/revised plan: Yes    Changes to Treatment Plan: No  New Goals added since last review:  No additional goals added at this time.    Goals worked on since last review:  Dimension 1 Patient reported no gambling.   Dimension 2 No reported changes in health.  Dimension 3 Patient shared he identifying handing anger better now then while gambling.   Dimension 4 Patient shared he hosted/cohosted three GA groups this week.   Dimension 5 Patient identified setting boundaries for \"toxic people\" he has dealt with in GA meetings.   Dimension 6 Patient attended group with speaker from [employer]. Discussion was held after speaker about GA and about page 17 from the GA yellow book. Patient reported attending his first in person GA meeting on Tuesday.     Strategies effective: yes    Strategies need these changes:   Continue to work on treatment plan goals.     Depression and Anxiety at Intake:   Patient's PHQ-9 score was 16 out of 27, indicating symptoms related to moderately severe depression.  Patient's CAROLANN-7 score was 13, indicating symptoms related to moderate anxiety.        Intake Dimension Ratings:    Dimension 1  0    Dimension 2  0    Dimension 3  2    Dimension 4  0    Dimension 5  " 3    Dimension 6  0      Guide to Risk Ratings for Suicidality:   IDEATION: Active thoughts of suicide? INTENT: Intent to follow on suicide? PLAN: Plan to follow through on suicide? Level of Risk:   IF Yes Yes Yes Patient = High Emergent   IF Yes Yes No Patient = High Urgent/Non-Emergent   IF Yes No No Patient = Moderate Non-Urgent   IF No No   No Patient = Low Risk   The patient's ADDITIONAL RISK FACTORS and lack of PROTECTIVE FACTORS may increase their overall suicide risk ratings.     Patient's/client's current risk rating:  Low Risk  Patient reported no thoughts of self-harm or suicide.   Safety Plan on File      Family Involvement:   none schedule this week    Data:   client did actively participate      Intervention:   Group feedback  Twelve Step facilitation      Assessment:   Stages of Change Model  Action    Appears/Sounds:  Cooperative  Engaged      Plan:  Focus on recovery environment  Monitor emotional/physical health      MAMADOU Nelson, [facility]

## 2023-04-20 ENCOUNTER — HOSPITAL ENCOUNTER (OUTPATIENT)
Dept: BEHAVIORAL HEALTH | Facility: CLINIC | Age: 43
Discharge: HOME | End: 2023-04-20
Attending: FAMILY MEDICINE
Payer: COMMERCIAL

## 2023-04-20 PROCEDURE — 90853 GROUP PSYCHOTHERAPY: CPT | Mod: 95

## 2023-04-21 NOTE — PROGRESS NOTES
"Patient:  Jose Antonio Krause                         Date of Assessment: 12/09/2022            Problem Gambling Progress Note and Treatment Plan Review     Attendance  Group/Individual: Phase III      Groups Attended:  04/20/2023    Support group attended this week: yes    Reporting sobriety:  yes    Client Treatment Goals:   1.\"Stop gambling\"  2.\"Getting rid of depression\"  3.\"Builiding a healthier mind frame\"    Treatment Plan     Treatment Plan Review competed on: April 20, 2023    Staff Members contributing:  Car[facility] Glodstein, [facility] & Nirlai Briceño, [employer], [facility]                         Received Supervision:  yes    Client: contributed to goals and plan.  Client received copy of plan/revised plan: Yes  Client agrees with plan/revised plan: Yes    Changes to Treatment Plan: No  New Goals added since last review:  No additional goals added at this time.    Goals worked on since last review:  Dimension 1 Patient reported no gambling.  Dimension 2 No reported changes in health.   Dimension 3 Patient shared he continues to use coping skills to help with emotion regulation.   Dimension 4 Patient continues to attend group sessions weekly.   Dimension 5 Patient added to discussion in group with topic on Gamblers Fallacy.   Dimension 6 Patient reported he continues to host and co-host GA meetings. Patient mentioned has been contacted by GA member to sign up to become a speaker to other recovery groups.     Strategies effective: yes    Strategies need these changes:   Continue to work on treatment plan goals.     Depression and Anxiety at Intake:     Patient's PHQ-9 score was 16 out of 27, indicating symptoms related to moderately severe depression.  Patient's CAROLANN-7 score was 13, indicating symptoms related to moderate anxiety.      Intake Dimension Ratings:    Dimension 1  0    Dimension 2  0    Dimension 3  2    Dimension 4  0    Dimension 5  3    Dimension 6  0      Guide to Risk Ratings for Suicidality:   IDEATION: " Active thoughts of suicide? INTENT: Intent to follow on suicide? PLAN: Plan to follow through on suicide? Level of Risk:   IF Yes Yes Yes Patient = High Emergent   IF Yes Yes No Patient = High Urgent/Non-Emergent   IF Yes No No Patient = Moderate Non-Urgent   IF No No   No Patient = Low Risk   The patient's ADDITIONAL RISK FACTORS and lack of PROTECTIVE FACTORS may increase their overall suicide risk ratings.     Patient's/client's current risk rating:  Low Risk  Patient reported no thoughts of self-harm or suicide.  Safety Plan on File      Family Involvement:   none schedule this week    Data:   client did actively participate      Intervention:   Counselor feedback  Education  Group feedback      Assessment:   Stages of Change Model  Action    Appears/Sounds:  Cooperative  Motivated  Engaged      Plan:  Focus on recovery environment      MAMADOU Nelson, [facility]

## 2023-04-21 NOTE — PROGRESS NOTES
Group Therapy Documentation     Was the patient seen in-person or via Telemedicine (if in-person skip to Group Note): Telemedicine    If Telemedicine: The patient's condition can be safely assessed and treated via synchronous audio and visual telemedicine encounter. ? ?      Reason for Telemedicine Visit: Services only offered telehealth    Originating Site (Patient Location): Patient's home    Distant Site (Provider Location): Provider Remote Setting- Home Office    Consent: ?The patient/guardian has verbally consented to: the potential risks and benefits of telemedicine (video visit) versus in person care; bill my insurance or make self-payment for services provided; and responsibility for payment of non-covered services.       Mode of Communication:??Video Conference via Zoom      As the provider I attest to compliance with applicable laws and regulations related to telemedicine.      Counselor verified Patient with 2-point verification: Patient is known to provider      Patient attended a video group session on the following days: ?          GROUP NOTE:  DATE OF SERVICE:  April 20, 2023    START TIME:  5:30pm    END TIME:  7:25pm    Group Length:   110 minutes    FACILITATOR(S):    MAMADOU Nelson, [facility]      TOPIC: BEH Group Therapy, Problem Gambling Group     Number of patients attending the group: 5    Group Therapy Type:  Problem Gambling Group    Group Attendance:  Client attended group     Summary of Group / Topics Discussed:   Group started with an introduction of new group member. The group took turns with introducing themselves and sharing about their compulsive gambling and their recovery. Each group member took turns with check in and identifying two feelings. Group watched a short video on Gamblers Fallacy and went over chapter of Gamblers Fallacy in the No-Dice workbook for dimension 5. Discussion was held on different ways group members identified chance, luck and odds of winning and losing.     "  Patient's response to the group topic/interactions:   Patient appeared to gain a better understanding of what is Gamblers Fallacy.       Client specific details:    Patient shared he went out of town for the weekend on a working vacation with friends. Patient shared he was paid in cash and  took money so it could be deposited into bank. Patient also shared while on trip the group of friends went to a local resturant/bar. Patient mentioned this was this first time he did not have the urge to walker and was not thinking about gambling with the money he got from working. 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 Patient reported having random thoughts of gambling but no urges. Patient shared \"they are more like memories now\". Patient shared his experience with the gamblers fallacy \"it's a real thing\". Patient mentioned all ways he would walker had a connection to the gamblers fallacy with him.       MAMADOU Nelson, [facility]    "

## 2023-04-27 ENCOUNTER — HOSPITAL ENCOUNTER (OUTPATIENT)
Dept: BEHAVIORAL HEALTH | Facility: CLINIC | Age: 43
Discharge: HOME | End: 2023-04-27
Attending: FAMILY MEDICINE
Payer: COMMERCIAL

## 2023-04-27 PROCEDURE — 90853 GROUP PSYCHOTHERAPY: CPT | Mod: 95

## 2023-04-28 NOTE — PROGRESS NOTES
"Patient:  Jose Antonio Krause                         Date of Assessment: 12/09/2022            Problem Gambling Progress Note and Treatment Plan Review     Attendance  Group/Individual: Phase III      Groups Attended: 04/27/2023    Support group attended this week: yes    Reporting sobriety:  yes    Client Treatment Goals:   1.\"Stop gambling\"  2.\"Getting rid of depression\"  3.\"Builiding a healthier mind frame\"      Treatment Plan     Treatment Plan Review competed on: April 27, 2023    Staff Members contributing:  Carla Goldstein, [facility], [facility] [place] [place] & Nirali Briceño, MAMADOU, [facility]                         Received Supervision:  yes    Client: contributed to goals and plan.  Client received copy of plan/revised plan: Yes  Client agrees with plan/revised plan: Yes    Changes to Treatment Plan: no  New Goals added since last review:  No additional goals added at this time.      Goals worked on since last review:  Dimension 1 Patient reported no gambling.  Dimension 2 No reported changes in health  Dimension 3 Patient reports he continues to work on emotion regulation.   Dimension 4 Patient continued to attend group session each week.   Dimension 5 Patient attended group and added to discussion on the topic of Money Habitudes.   Dimension 6 Patient shared having lunch with his son over the weekend. Patient shared he continues to attend and host GA meetings.     Strategies effective: yes    Strategies need these changes:   Continue to work on treatment plan goals.    Depression and Anxiety at Intake:   Patient's PHQ-9 score was 16 out of 27, indicating symptoms related to moderately severe depression.  Patient's CAROLANN-7 score was 13, indicating symptoms related to moderate anxiety.        Intake Dimension Ratings:    Dimension 1  0    Dimension 2  0    Dimension 3  2    Dimension 4  0    Dimension 5  3    Dimension 6  0      Guide to Risk Ratings for Suicidality:   IDEATION: Active thoughts of suicide? INTENT: Intent to follow on " suicide? PLAN: Plan to follow through on suicide? Level of Risk:   IF Yes Yes Yes Patient = High Emergent   IF Yes Yes No Patient = High Urgent/Non-Emergent   IF Yes No No Patient = Moderate Non-Urgent   IF No No   No Patient = Low Risk   The patient's ADDITIONAL RISK FACTORS and lack of PROTECTIVE FACTORS may increase their overall suicide risk ratings.     Patient's/client's current risk rating:  Low Risk  Patient reports no thoughts of self-harm or suicide  Safety Plan on File      Family Involvement:   none schedule this week    Data:   client did actively participate      Intervention:   Counselor feedback  Education  Group feedback      Assessment:   Stages of Change Model  Action    Appears/Sounds:  Engaged      Plan:  Monitor emotional/physical health      MAMADOU Nelson, [facility]

## 2023-04-28 NOTE — PROGRESS NOTES
Group Therapy Documentation     Was the patient seen in-person or via Telemedicine (if in-person skip to Group Note): Telemedicine    If Telemedicine: The patient's condition can be safely assessed and treated via synchronous audio and visual telemedicine encounter. ? ?      Reason for Telemedicine Visit: Services only offered telehealth    Originating Site (Patient Location): Patient's home    Distant Site (Provider Location): Provider Remote Setting- Home Office    Consent: ?The patient/guardian has verbally consented to: the potential risks and benefits of telemedicine (video visit) versus in person care; bill my insurance or make self-payment for services provided; and responsibility for payment of non-covered services.       Mode of Communication:??Video Conference via Zoom      As the provider I attest to compliance with applicable laws and regulations related to telemedicine.      Counselor verified Patient with 2-point verification: Patient is known to provider      Patient attended a video group session on the following days: ?          GROUP NOTE:  DATE OF SERVICE:  April 27, 2023    START TIME:  5:30pm    END TIME:  7:20pm    Group Length:   110 minutes    FACILITATOR(S):    MAMADOU Nelson, [facility]      TOPIC: BEH Group Therapy, Problem Gambling Group     Number of patients attending the group: 9    Group Therapy Type:  Problem Gambling Group    Group Attendance:  Client attended group     Summary of Group / Topics Discussed:   Group started with the introduction of new group member. Everyone in group took turns with introducing themselves and welcoming new group members. Each group member took turns with check in and identifying two feelings. Topic of debt consolidation and payback was discussed by group members, what they have tried and what were some positives and negatives to negotiating paybacks on their own. Discussion was held on new assessment/game writer had been trained in this week on Money  "Habitudes for dimension 5. Writer shared how this is a good tool to look at money habits and attitudes. Writer emailed each group member a free link to complete assessment/game at end of group. If patient chooses to participate patient and writer will get a summary to discuss at a later date. A short video was shown to group about steps to complete and writer showed group example of what a summary looks like after completion. Group ended with a Positive Thoughts reading.      Patient's response to the group topic/interactions:   Patient appeared to gain a better understanding of debt consolidation and Money Habitudes assessment/game.       Client specific details:    Patient shared he \"was not sleeping a lot\" patient shared this caused him to \"self medicate legally\". Patient shared he as letting his self care go, not working out and not listening to podcasts as he had done previously. Patient shared having lunch with son this weekend and his son helped his realize he needed \"to get back to it\". Patient reported talking a few days to get more sleep and took a break from GA meetings because they end so late at night. Patient reports feeling better now. Patient shared he would like to do the assessment/game for Money Habitudes to see if what he thinks are his habits with money come up as the same in the assessment.       MAMADOU Nelson, [facility]    "

## 2023-05-04 ENCOUNTER — HOSPITAL ENCOUNTER (OUTPATIENT)
Dept: BEHAVIORAL HEALTH | Facility: CLINIC | Age: 43
Discharge: HOME | End: 2023-05-04
Attending: FAMILY MEDICINE
Payer: COMMERCIAL

## 2023-05-04 PROCEDURE — 90853 GROUP PSYCHOTHERAPY: CPT | Mod: 95

## 2023-05-05 NOTE — PROGRESS NOTES
Group Therapy Documentation     Was the patient seen in-person or via Telemedicine (if in-person skip to Group Note): Telemedicine    If Telemedicine: The patient's condition can be safely assessed and treated via synchronous audio and visual telemedicine encounter. ? ?      Reason for Telemedicine Visit: Services only offered telehealth    Originating Site (Patient Location): Patient's home    Distant Site (Provider Location): Provider Remote Setting- Home Office    Consent: ?The patient/guardian has verbally consented to: the potential risks and benefits of telemedicine (video visit) versus in person care; bill my insurance or make self-payment for services provided; and responsibility for payment of non-covered services.       Mode of Communication:??Video Conference via Zoom      As the provider I attest to compliance with applicable laws and regulations related to telemedicine.      Counselor verified Patient with 2-point verification: Patient is known to provider      Patient attended a video group session on the following days: ?          GROUP NOTE:  DATE OF SERVICE:  May 4, 2023    START TIME:  5:30pm    END TIME:  7:20pm    Group Length:   110 minutes    FACILITATOR(S):    MAMADOU Nelson, [facility]      TOPIC: BEH Group Therapy, Problem Gambling Group     Number of patients attending the group: 8    Group Therapy Type:  Problem Gambling Group    Group Attendance:  Client attended group     Summary of Group / Topics Discussed:   Group started with each member taking turns for check in and identifying two feelings. Announcements were made about upcoming family group speakers. One group member shared their completed Life Balance Wheel and discussed what they identified. A video was watched Understand & Manage your Monkey Mind for dimensions 3/5. Discussion was held on the video topic. Group members discussed the worksheet What do you Enjoy? Group ended with a Positive Thoughts reading.      Patient's response to  "the group topic/interactions:    Patient appeared to gain a better understanding of The Monkey Mind.       Client specific details:   Patient emailed writer before group stating he would be late. Patient arrived in group at 5:54pm.  Patient shared he has been asked to be a GA speaker. Also sharing he continues to attend and run GA groups. Patient shared he continues to work on emotion identification. Patient shared his previously filled out Life Balance Wheel with group. Sharing he has had some positive changes in some of the areas. Patient shared he enjoyed watching the Monkey Mind video again. Patient shared he \"recongnizes and is more in control\" of his Monkey Mind now that he is further in recovery.       Nirali Briceño, MAMADOU, [facility]I    "

## 2023-05-05 NOTE — PROGRESS NOTES
"Patient:  Jose Antonio Krause                         Date of Assessment: 12/09/2022            Problem Gambling Progress Note and Treatment Plan Review     Attendance  Group/Individual: Phase III      Groups Attended: 05/04/23    Support group attended this week: yes    Reporting sobriety:  yes    Client Treatment Goals:   1.\"Stop gambling\"  2.\"Getting rid of depression\"  3.\"Builiding a healthier mind frame\"      Treatment Plan     Treatment Plan Review competed on: May 4, 2023    Staff Members contributing:  Carla Goldstein, [facility], [facility] & Nirali Briceño, [facility], [facility]                         Received Supervision:  yes    Client: contributed to goals and plan.  Client received copy of plan/revised plan: Yes  Client agrees with plan/revised plan: Yes    Changes to Treatment Plan: No  New Goals added since last review:  No additional goals added at this time.    Goals worked on since last review:  Dimension 1 Patient reported no gambling.  Dimension 2 No reported changes in health.   Dimension 3 Patient continues to work on emotion identification.    Dimension 4 Patient continues to attend group weekly.   Dimension 5 Patient attended group and added to discussion on the topic of Understanding the Monkey Mind.   Dimension 6 Patient shared he continues to attend and lead GA meetings. Patient shared he has not been asked to be a GA speaker.     Strategies effective: yes    Strategies need these changes:   Continue to work on treatment plan goals.    Depression and Anxiety at Intake:   Patient's PHQ-9 score was 16 out of 27, indicating symptoms related to moderately severe depression.  Patient's CAROLANN-7 score was 13, indicating symptoms related to moderate anxiety.     Intake Dimension Ratings:    Dimension 1  0    Dimension 2  0    Dimension 3  2    Dimension 4  0    Dimension 5  3    Dimension 6  0      Guide to Risk Ratings for Suicidality:   IDEATION: Active thoughts of suicide? INTENT: Intent to follow on suicide? PLAN: " Plan to follow through on suicide? Level of Risk:   IF Yes Yes Yes Patient = High Emergent   IF Yes Yes No Patient = High Urgent/Non-Emergent   IF Yes No No Patient = Moderate Non-Urgent   IF No No   No Patient = Low Risk   The patient's ADDITIONAL RISK FACTORS and lack of PROTECTIVE FACTORS may increase their overall suicide risk ratings.     Patient's/client's current risk rating:  Low Risk  Patient reported no thoughts of self-harm or suicide.  Safety Plan on File      Family Involvement:   none schedule this week    Data:   client did actively participate      Intervention:   Counselor feedback  Education  Group feedback      Assessment:   Stages of Change Model  Maintenance    Appears/Sounds:  Engaged      Plan:  Focus on recovery environment      MAMADOU Nelson, [facility]

## 2023-05-09 ENCOUNTER — TELEPHONE (OUTPATIENT)
Dept: BEHAVIORAL HEALTH | Facility: CLINIC | Age: 43
End: 2023-05-09
Payer: COMMERCIAL

## 2023-05-09 NOTE — TELEPHONE ENCOUNTER
Email to patient:    Apolinar Brady,     I would like to set up an individual session with you. To discuss your progress in Phase III and the future.     Opening I have next week are:  Thursday the 18th at 1pm, 3pm or 4pm    Please let me know if any of those times work for you. If not we can look at the next week.     Thank you,

## 2023-05-10 NOTE — TELEPHONE ENCOUNTER
Email from patient:    1pm if it works              Email to patient:    Great, I will get you on the schedule.     See you tomorrow.

## 2023-05-11 ENCOUNTER — HOSPITAL ENCOUNTER (OUTPATIENT)
Dept: BEHAVIORAL HEALTH | Facility: CLINIC | Age: 43
Discharge: HOME | End: 2023-05-11
Attending: FAMILY MEDICINE
Payer: COMMERCIAL

## 2023-05-11 PROCEDURE — 90853 GROUP PSYCHOTHERAPY: CPT | Mod: GT

## 2023-05-12 NOTE — PROGRESS NOTES
Group Therapy Documentation     Was the patient seen in-person or via Telemedicine (if in-person skip to Group Note): Telemedicine    If Telemedicine: The patient's condition can be safely assessed and treated via synchronous audio and visual telemedicine encounter. ? ?      Reason for Telemedicine Visit: Services only offered telehealth    Originating Site (Patient Location): Patient's home    Distant Site (Provider Location): Provider Remote Setting- Home Office    Consent: ?The patient/guardian has verbally consented to: the potential risks and benefits of telemedicine (video visit) versus in person care; bill my insurance or make self-payment for services provided; and responsibility for payment of non-covered services.       Mode of Communication:??Video Conference via Zoom      As the provider I attest to compliance with applicable laws and regulations related to telemedicine.      Counselor verified Patient with 2-point verification: Patient is known to provider      Patient attended a video group session on the following days: ?          GROUP NOTE:  DATE OF SERVICE:  May 11, 2023    START TIME:  5:30pm    END TIME:  7:30pm     Group Length:   120 minutes    FACILITATOR(S):   MAMADOU Nelson, [facility]      TOPIC: BEH Group Therapy, Problem Gambling Group     Number of patients attending the group: 6    Group Therapy Type:  Problem Gambling Group    Group Attendance:  Client attended group     Summary of Group / Topics Discussed:   Group started with an introduction to our speaker from [employer]. Speaker shared their story about gambling and recovery. Then discussed GA groups and group members asked questions to speaker about ways he has dealt with different issues during his recovery.  After speaker left group, discussion continued about GA, who has participated in groups these groups and what their experiences have been. Group members took turns for check in and identifying two feelings. One group  "member shared results from completing the Life Balance Wheel and Money Habitudes assessment. Group ended with positive works by multiple group members.     Patient's response to the group topic/interactions:    Patient appeared to gain and share information about GA.       Client specific details:     Patient shared he will be speaking to the inpatient compulsive gambling group tomorrow about GA. Patient shared he is \"excited and nervous\". Patient shared he has identified that lack of sleep is not good for his recovery. Patient reports reaching out to many people in GA in groups and via phone and text throughout the week.     ESVIN NelsonC, [facility]-I    "

## 2023-05-12 NOTE — PROGRESS NOTES
"Patient:  Jose Antonio Krause                         Date of Assessment: 12/09/2022            Problem Gambling Progress Note and Treatment Plan Review     Attendance  Group/Individual: Phase III      Groups Attended: 05/11/2023    Support group attended this week: yes    Reporting sobriety:  yes    Client Treatment Goals:   1.\"Stop gambling\"  2.\"Getting rid of depression\"  3.\"Builiding a healthier mind frame\"      Treatment Plan     Treatment Plan Review competed on: May 11, 2023    Staff Members contributing:  Carla Goldstein, [facility], [facility] & Nirali Briceño, [facility], [facility]                         Received Supervision:  yes    Client: contributed to goals and plan.  Client received copy of plan/revised plan: Yes  Client agrees with plan/revised plan: Yes    Changes to Treatment Plan: No  New Goals added since last review:  No additional goals added at this time.    Goals worked on since last review:  Dimension 1 Patient reported no gambling.  Dimension 2 Patient reported no change in health.   Dimension 3 Patient continues to work on emotions regulation.   Dimension 4 Patient will be meeting with staff next Thursday about graduation.   Dimension 5 Patient continues to work on treatment plan goals.   Dimension 6 Patient attended group and added to discussion with GA Speaker.     Strategies effective: yes    Strategies need these changes:   Continue to work on treatment plan goals.    Depression and Anxiety at Intake:   Patient's PHQ-9 score was 16 out of 27, indicating symptoms related to moderately severe depression.  Patient's CAROLANN-7 score was 13, indicating symptoms related to moderate anxiety.        Intake Dimension Ratings:    Dimension 1  0    Dimension 2  0    Dimension 3  2    Dimension 4  0    Dimension 5  3    Dimension 6  0      Guide to Risk Ratings for Suicidality:   IDEATION: Active thoughts of suicide? INTENT: Intent to follow on suicide? PLAN: Plan to follow through on suicide? Level of Risk:   IF Yes Yes " Yes Patient = High Emergent   IF Yes Yes No Patient = High Urgent/Non-Emergent   IF Yes No No Patient = Moderate Non-Urgent   IF No No   No Patient = Low Risk   The patient's ADDITIONAL RISK FACTORS and lack of PROTECTIVE FACTORS may increase their overall suicide risk ratings.     Patient's/client's current risk rating:  Low Risk  Patient reports no thoughts of self-harm or suicide.   Safety Plan on File      Family Involvement:   none schedule this week    Data:   client did actively participate      Intervention:   Counselor feedback  Group feedback  Twelve Step facilitation      Assessment:   Stages of Change Model  Maintenance    Appears/Sounds:  Cooperative  Motivated  Engaged      Plan:  Focus on recovery environment  Monitor emotional/physical health      MAMADOU Nelson, [facility]

## 2023-05-18 ENCOUNTER — HOSPITAL ENCOUNTER (OUTPATIENT)
Dept: BEHAVIORAL HEALTH | Facility: CLINIC | Age: 43
Discharge: HOME | End: 2023-05-18
Attending: FAMILY MEDICINE
Payer: COMMERCIAL

## 2023-05-18 ENCOUNTER — TELEPHONE (OUTPATIENT)
Dept: BEHAVIORAL HEALTH | Facility: CLINIC | Age: 43
End: 2023-05-18
Payer: COMMERCIAL

## 2023-05-18 PROCEDURE — 90853 GROUP PSYCHOTHERAPY: CPT | Mod: GT

## 2023-05-18 PROCEDURE — 90832 PSYTX W PT 30 MINUTES: CPT | Mod: GT

## 2023-05-18 ASSESSMENT — ANXIETY QUESTIONNAIRES
3. WORRYING TOO MUCH ABOUT DIFFERENT THINGS: NOT AT ALL
1. FEELING NERVOUS, ANXIOUS, OR ON EDGE: SEVERAL DAYS
4. TROUBLE RELAXING: SEVERAL DAYS
GAD7 TOTAL SCORE: 4
6. BECOMING EASILY ANNOYED OR IRRITABLE: SEVERAL DAYS
7. FEELING AFRAID AS IF SOMETHING AWFUL MIGHT HAPPEN: NOT AT ALL
2. NOT BEING ABLE TO STOP OR CONTROL WORRYING: NOT AT ALL
5. BEING SO RESTLESS THAT IT IS HARD TO SIT STILL: SEVERAL DAYS
GAD7 TOTAL SCORE: 4

## 2023-05-18 NOTE — PROGRESS NOTES
"Individual counseling session:    Telemedicine Visit: The patient's condition can be safely assessed and treated via synchronous audio and visual telemedicine encounter.      Reason for Telemedicine Visit: Services only offered telehealth    Originating Site (Patient Location): Patient's other in work truck at address [id number] [place], [place]    Distant Site (Provider Location): [facility] Outpatient Setting: [place] office    Consent:  The patient/guardian has verbally consented to: the potential risks and benefits of telemedicine (video visit) versus in person care; bill my insurance or make self-payment for services provided; and responsibility for payment of non-covered services.     Mode of Communication:  Video Conference via [employer]    As the provider I attest to compliance with applicable laws and regulations related to telemedicine.    Counselor verified Patient with 2-point verification: Patient is known to provider.    Video visit start time: 1:00pm  Video visit end time: 1:35pm    Length of session: 35 minutes    D: Writer and patient met for a scheduled individual video session. Patient shared he was at work in his work truck and gave writer address of location, see above. Patient completed assessments PHQ, CAROLANN and PROMIS. Patient and writer updated patient's treatment plan. Patient shared he has been hosting two GA meetings once per week and attending one in person meeting in [place] once per week. Patient shared he spoke on behalf of GA for another recovery service last week and patient said \"it was amazing\". Patient mentioned he continues to see his mental health therapist. Patient shared he would like to get more information on how to become a peer . Writer and patient discussed graduation from program patient shared \"I am ready\". It was agreed upon that patient will graduate from program next Thursday 05/25/23.   I: Counselor facilitated 1:1 session.  A: " Patient appeared excited to be graduating from program.   P: Patient will attend group session tonight. Writer will request graduation coin to be mailed out. Also, writer will email graduation worksheets one for review and one to present to group next week.     MAMADOU Nelson, [facility]

## 2023-05-18 NOTE — TELEPHONE ENCOUNTER
Email to patient:    Apolinar Brady,     Here are the forms we discussed to fill out for your graduation. Please take time in filling these out.     Continuing Care Plan is to fill out and share with the group on your graduation night next Thursday 05/25/2023.  Planning aftercare for you to fill out and use as your reference to have for the future.     Once you have completed the Continuing Care Plan, please email me a copy for your medical records.     Again, congratulations!

## 2023-05-19 NOTE — PROGRESS NOTES
"Patient:  Jose Antonio Krause                         Date of Assessment: 12/09/2023            Problem Gambling Progress Note and Treatment Plan Review     Attendance  Group/Individual: Phase III    Groups Attended: 05/18/2023    Support group attended this week: yes    Reporting sobriety:  yes    Client Treatment Goals:   1.\"Stop gambling\"  2.\"Getting rid of depression\"  3.\"Builiding a healthier mind frame\"         Treatment Plan     Treatment Plan Review competed on: May 18, 2023    Staff Members contributing:  Carla Goldstein, LADC, [facility] & Nirali Briceño, MAMADOU, [facility]                         Received Supervision:  yes    Client: contributed to goals and plan.  Client received copy of plan/revised plan: Yes  Client agrees with plan/revised plan: Yes    Changes to Treatment Plan: No  New Goals added since last review:  No additional goals added at this time.    Goals worked on since last review:  Dimension 1 Patient reported no gambling.  Dimension 2 Patient reported no change in health.  Dimension 3 Patient attended group and added to discussion on Feelings and Emotions from the [employer] workbook.   Dimension 4 Patient will be graduating next Thursday from programming.   Dimension 5 Patient continues to work on treatment plan goals.   Dimension 6 Patient reports continued GA attendance in meetings as well as hosting meetings and speaking on behalf of GA. Patient attended group and added to discussion on Spirituality, step 2 in GA.     Strategies effective: yes    Strategies need these changes:   Continue to work on treatment plan goals.    Depression and Anxiety at Intake:     Patient's PHQ-9 score was 16 out of 27, indicating symptoms related to moderately severe depression.  Patient's CAROLANN-7 score was 13, indicating symptoms related to moderate anxiety.      Intake Dimension Ratings:    Dimension 1  0    Dimension 2  0    Dimension 3  2    Dimension 4  0    Dimension 5  3    Dimension 6  0      Guide to Risk " Ratings for Suicidality:   IDEATION: Active thoughts of suicide? INTENT: Intent to follow on suicide? PLAN: Plan to follow through on suicide? Level of Risk:   IF Yes Yes Yes Patient = High Emergent   IF Yes Yes No Patient = High Urgent/Non-Emergent   IF Yes No No Patient = Moderate Non-Urgent   IF No No   No Patient = Low Risk   The patient's ADDITIONAL RISK FACTORS and lack of PROTECTIVE FACTORS may increase their overall suicide risk ratings.     Patient's/client's current risk rating:  Low Risk  Patient reports no thoughts of self-harm or suicide.  Safety Plan on File      Family Involvement:   none schedule this week    Data:   client did actively participate      Intervention:   Counselor feedback  Education  Group feedback      Assessment:   Stages of Change Model  Maintenance    Appears/Sounds:  Cooperative  Motivated  Engaged      Plan:  Focus on recovery environment      MAMADOU Nelson, [facility]

## 2023-05-19 NOTE — PROGRESS NOTES
Group Therapy Documentation     Was the patient seen in-person or via Telemedicine (if in-person skip to Group Note): Telemedicine    If Telemedicine: The patient's condition can be safely assessed and treated via synchronous audio and visual telemedicine encounter. ? ?      Reason for Telemedicine Visit: Services only offered telehealth    Originating Site (Patient Location): Patient's home    Distant Site (Provider Location): [facility] Outpatient Setting: [place] [facility]    Consent: ?The patient/guardian has verbally consented to: the potential risks and benefits of telemedicine (video visit) versus in person care; bill my insurance or make self-payment for services provided; and responsibility for payment of non-covered services.       Mode of Communication:??Video Conference via Zoom      As the provider I attest to compliance with applicable laws and regulations related to telemedicine.      Counselor verified Patient with 2-point verification: Patient is known to provider      Patient attended a video group session on the following days: ?          GROUP NOTE:  DATE OF SERVICE:  May 18, 2023    START TIME:  5:30pm    END TIME:  7:25pm    Group Length:   115 Minutes    FACILITATOR(S):    MAMADOU Nelson, [facility]      TOPIC: BEH Group Therapy, Problem Gambling Group     Number of patients attending the group: 5    Group Therapy Type:  Problem Gambling Group    Group Attendance:  Client attended group     Summary of Group / Topics Discussed:   Group started with a check in and feeling identification from all group members. Information was shared by writer about upcoming change in program. Group read and discussed chapter on Feelings and Emotions from the [employer] workbook for dimension 3. Discussion was held on the GA Step 2 on Spirituality for dimension 6. Discussing questions to journal about, taken from the GA Step Writing book. Group ended with a positive thoughts reading.      Patient's response to the  "group topic/interactions:    Patient appeared to gain a better understanding of how to identify their own emotions and feelings. Also gaining a better understanding on the GA Step 2.       Client specific details:     Patient shared he continues to attend in person GA meetings on Wednesday and helps virtually host GA meeting during the week. Patient shared with group he did his first GA speaking last week. Patient shared spending time with mother and siblings on Mother's Day. Patient shared to group he will be graduating from program next Thursday. Patient shared he uses the Serenity Prayer as and \"I\" statement to himself. Patient identified his fellow group members in recovery and his two sons as his greater power.       Nirali Briceño, MAMADOU, [facility]-I          "

## 2023-05-25 ENCOUNTER — HOSPITAL ENCOUNTER (OUTPATIENT)
Dept: BEHAVIORAL HEALTH | Facility: CLINIC | Age: 43
Discharge: HOME | End: 2023-05-25
Attending: FAMILY MEDICINE
Payer: COMMERCIAL

## 2023-05-25 PROCEDURE — 90853 GROUP PSYCHOTHERAPY: CPT | Mod: GT

## 2023-05-26 NOTE — PROGRESS NOTES
Group Therapy Documentation     Was the patient seen in-person or via Telemedicine (if in-person skip to Group Note): Telemedicine    If Telemedicine: The patient's condition can be safely assessed and treated via synchronous audio and visual telemedicine encounter. ? ?      Reason for Telemedicine Visit: Services only offered telehealth    Originating Site (Patient Location): Patient's home    Distant Site (Provider Location): [facility] Outpatient Setting: [facility]    Consent: ?The patient/guardian has verbally consented to: the potential risks and benefits of telemedicine (video visit) versus in person care; bill my insurance or make self-payment for services provided; and responsibility for payment of non-covered services.       Mode of Communication:??Video Conference via Zoom      As the provider I attest to compliance with applicable laws and regulations related to telemedicine.      Counselor verified Patient with 2-point verification: Patient is known to provider      Patient attended a video group session on the following days: ?          GROUP NOTE:  DATE OF SERVICE:  May 25, 2023    START TIME:  5:30pm    END TIME:  7:30pm    Group Length:   120 Minutes    FACILITATOR(S):   MAMADOU Nelson, [facility]      TOPIC: BEH Group Therapy, Problem Gambling Group     Number of patients attending the group: 5    Group Therapy Type:  Problem Gambling Group    Group Attendance:  Client attended group     Summary of Group / Topics Discussed:   Group started with the introduction of our speaker Radhika Nickerson a financial counselor from [employer] for dimension 6. Patients took turns asking financial questions and Radhika shared information on paying off credit cards and personal loans, budgeting. Ways to get the best rates and lowered amounts on accounts in collections. Also, how to raise your credit score and information on what makes up a credit score. After speaker left, patients took turns with check in and  identifying two feelings. Graduation ceremony was held for group member. Group member shared his completed worksheet on Relapse Prevention Plan. Writer and group members took turns wishing graduate well and sharing stories of the impact he had made being a member of the group. Group ended with a Positive Thoughts reading.      Patient's response to the group topic/interactions:   Patient appeared to gain a better understanding about finances, credit scores and budgeting.       Client specific details:    This was patient final group in program. Patient has officially graduated/completed program. Patient shared he continues with his mental health therapist, with hosting and attending GA meeting. Patient shared with group support on their recovery journeys.      Nirali Briceño, MAMADOU, [facility]

## 2023-05-26 NOTE — PROGRESS NOTES
"Patient:  Jose Antonio Krause                         Date of Assessment: 12/09/2022            Problem Gambling Progress Note and Treatment Plan Review     Attendance  Group/Individual: Phase III     Groups Attended: 05/25/2023    Support group attended this week: yes    Reporting sobriety:  yes    Client Treatment Goals:   1.\"Stop gambling\"  2.\"Getting rid of depression\"  3.\"Builiding a healthier mind frame\"      Treatment Plan     Treatment Plan Review competed on: May 25, 2023    Staff Members contributing:  MAMADOU Nelson, [facility]                           Received Supervision:  yes    Client: contributed to goals and plan.  Client received copy of plan/revised plan: Yes  Client agrees with plan/revised plan: Yes    Changes to Treatment Plan: No  New Goals added since last review:  No additional goals added at this time.    Goals worked on since last review:  Dimension 1 Patient reported no gambling.  Dimension 2 Patient reported no change in health.  Dimension 3 Patient shared he continues sessions with his mental health therapist.   Dimension 4 Patient attended his last group in programming and has graduated/completed program.   Dimension 5 Patient continues to use coping skills for urges.   Dimension 6 Patient attended group and asked questions of a Financial Counselor.    Strategies effective: yes    Strategies need these changes:     Depression and Anxiety at Intake:   Patient's PHQ-9 score was 16 out of 27, indicating symptoms related to moderately severe depression.  Patient's CAROLANN-7 score was 13, indicating symptoms related to moderate anxiety.     Intake Dimension Ratings:    Dimension 1  0    Dimension 2  0    Dimension 3  2    Dimension 4  0    Dimension 5  3    Dimension 6  0      Guide to Risk Ratings for Suicidality:   IDEATION: Active thoughts of suicide? INTENT: Intent to follow on suicide? PLAN: Plan to follow through on suicide? Level of Risk:   IF Yes Yes Yes Patient = High Emergent   IF Yes Yes " No Patient = High Urgent/Non-Emergent   IF Yes No No Patient = Moderate Non-Urgent   IF No No   No Patient = Low Risk   The patient's ADDITIONAL RISK FACTORS and lack of PROTECTIVE FACTORS may increase their overall suicide risk ratings.     Patient's/client's current risk rating:  Low Risk  Patient reports no thoughts of self-harm or suicide.   Safety Plan on File      Family Involvement:   none schedule this week    Data:   good insight client did actively participate      Intervention:   Counselor feedback  Group feedback      Assessment:   Stages of Change Model  Maintenance    Appears/Sounds:  Cooperative  Motivated  Engaged      Plan:  Focus on recovery environment      MAMADOU Nelson, [facility]

## 2023-05-31 NOTE — DISCHARGE SUMMARY
PROBLEM GAMBLING DISCHARGE SUMMARY    PATIENT NAME:  Jose Antonio Krause  MRN #: [id number]  :  1980    TREATMENT COUNSELING TEAM: Carla Goldstein, MAMADOU, [facility] & MAMADOU Nelson, [facility]    PROGRAM:  [facility] Outpatient Compulsive Gambling Treatment Program    EVALUATION DATE: 2023  DATE OF LAST SESSION: 2023  DISCHARGE DATE: 2023    DIAGNOSIS:  Compulsive Gambling 312.21    HOURS OF TREATMENT COMPLETED:  Patient completed Phase I, II and III of programming at time of graduation/discharge.  Patient participated in group and individual sessions.    DISCHARGE STATUS: With Staff Approval. Patient completed [facility] Compulsive Gambling outpatient program.       PRESENTING INFORMATION: At time of assessment patient reported a psychiatric hospitalization on 2022. Patient reported suicidal ideation with a plan and went to emergency room for help following a significant financial gambling loss. Patient reported being in patient until discharged on 2023. Patient shared he as referred from inpatient to [facility] Compulsive Gambling Program. Patient reported last gambled on 2022. Patient reported gambling since before the age of 16.     SERVICES PROVIDED:  Our services included assessment, treatment planning and education regarding addiction, relationships, and relapse prevention.  The patient also participated in group therapy, individual therapy, and aftercare planning.    ISSUES ADDRESS IN TREATMENT:    DIMENSION 1 - ACUTE INTOXICATION/WITHDRAWAL POTENTIAL  ADMISSION RISK RATIN  DISCHARGE RISK RATIN  Upon admission patient reported no current substance related issues. Patient reported a long history of gambling with escalating gambling at [employer]. Upon admission patient did not appear to be under the influence or appear to be having any withdrawal symptoms. During programming patient did identify having issues with Post-Acute Withdrawal Symptoms (PAWS) from  gambling. Upon discharge patient reported no withdrawal symptoms.       DIMENSION 2 - BIOMEDICAL COMPLICATIONS AND CONDITIONS  ADMISSION RISK RATIN  DISCHARGE RISK RATIN  Upon admission patient reported having been diagnosed with an autoimmune disorder but denied having any ongoing issues related to diagnosis at that time. Patient reported having a primary health provider and reported ability to navigate the health care system independently. Upon admission, during programming and at discharge patient reported attending the gym regularly and eating healthy.     DIMENSION 3 - EMOTIONAL, BEHAVIORAL, COGNITIVE CONDITIONS AND COMPLICATIONS  ADMISSION RISK RATIN  DISCHARGE RISK RATIN  Upon admission patient reported being diagnosed with ADHD and OCD while in previous gambling treatment in . Also reported being recently diagnosed with depression while in patient in December. Patient reported he was prescribed medication for new mental health diagnosis. Upon admission patient reported taking medication as prescribed. Upon admission patient shared he had an established mental health therapist. During programming and at discharge patient shared he continues to see therapist monthly. Upon admission patient shared he had one suicide attempt  about a year and a half ago . Reporting he was hospitalized at that time. Also being hospitalized in 2022 for suicide ideation with plan. Upon admission patient completed a safety plan. During programming patient learned to utilize emotions list, working on emotion identification and regulation. Patient attended groups and added to discussion with topics on levels of anger and resolution, identifying guilt and shame, grief and loss, mindfulness, strengthening communication and other coping skills. During programming and at discharge patient reports no thoughts of self-harm or suicide.     DIMENSION 4 - READINESS FOR CHANGE  ADMISSION RISK RATIN  DISCHARGE  RISK RATIN  Upon admission patient reported gambling had affected his relationships due to lying being deceitful and hiding his gambling. Patient reported financial strain due to gambling. Upon admission patient stated when asked what changes he willing to make relative to gambling  I am for the first time ever. I m ready not to walker and do what every I need to do to say out of it . Patient reported being  very motivated  to stop gambling. During programming patient attended group and individual sessions. Also completed weekly check ins, attended groups with topics on getting motivated, biology of change, SMART Goals, morals -values and ethics and practicing gratitude. Upon discharge patient discussed wanting to help others with peer recovery and GA support. Patient shared this will help him continue with his recovery and maintenance.     DIMENSION 5 - RELAPSE, CONTINUED USE AND CONTINUED PROBLEM POTENTIAL   ADMISSION RISK RATING: 3  DISCHARGE RISK RATIN  Upon admission patient identified triggers and situation that would increase his likelihood to walker. These were  access to cash, feeling lonely and depressed, and relationship stressors . Patient reported attending a gambling treatment in  in [place]. Patient reported being walker free for  9 months to a year  after treatment. Patient identified attending GA in the past. During programing patient was consistent with attending GA meetings in person and virtually. Patient started to host GA meetings and started becoming a speaker for GA to other gambling treatment groups. Patient shared he was in contact with many GA members for support. During programming patient attended groups and added to discussion on the topics of emotional meaning of money, money and finances, gamblers fallacy, trigger inventory, cross addictions. Patient also attended groups with speaker from [employer].     DIMENSION 6 - RECOVERY ENVIRONMENT  ADMISSION RISK RATING:  "0  DISCHARGE RISK RATIN  Upon admission during programming and upon discharge patient reported working full time. 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 Upon admission patient reported  in serious debt  from gambling. During programming patient shared with group and writer updates on paying off gambling debt and working with friend to protect money. Upon admission during programming and at discharge patient reported continued connection with family members: Mother, brothers and sons. Upon admission patient reported being  with no serious relationship. Upon discharge patient reported he would be  going to start dating again . Upon admission patient reported occasionally attending [employer] with the willingness to attend more often. During programming patient attended groups and added to discussion on group topics of finding purpose, finding leisure activities supportive of recovery, ways to reduce screen time and attended groups with speakers from GA and a Financial Counselor.     STRENGTHS:  Upon assessment patient reported his greatest personal strength as  caring for my family . Patient also displays a positive attitude, progress, participation and motivation during programming. Patient appears to be receptive to accepting long term abstinence and the need for aftercare.    PROGNOSIS:  Prognosis for this patient is Favorable Discharge - Patients has completed agreed upon treatment goals, understands their diagnosis, and appears motivated about follow-up care as needed.      CONTINUING CARE RECOMMENDATIONS:      Abstain from all forms of gambling, including \"free\" games and online/el games.    Refrain from entering all types of codi establishments.    Self-ban with the help of a trusted other from all local casinos/card rooms, cut up players cards and have all gambling mail stopped.    Abstain from alcohol and all mood-altering " substances unless prescribed by a licensed medical provider.    Utilize [employer] on all electronic devices as a resource to block any online codi.    Attend Gamblers Anonymous (GA) 12-step, cultural, spiritual, and/or other supportive community meetings on a weekly basis.    Check in weekly with a trusted individual who will hold you accountable.    Nirali Briceño, MAMADOU, [facility]-I

## 2023-05-31 NOTE — ADDENDUM NOTE
Encounter addended by: Nirali Briceño [facility] on: 5/31/2023 3:04 PM   Actions taken: Clinical Note Signed

## 2024-03-10 ENCOUNTER — HEALTH MAINTENANCE LETTER (OUTPATIENT)
Age: 44
End: 2024-03-10

## 2025-03-16 ENCOUNTER — HEALTH MAINTENANCE LETTER (OUTPATIENT)
Age: 45
End: 2025-03-16